# Patient Record
Sex: FEMALE | Race: WHITE | NOT HISPANIC OR LATINO | Employment: UNEMPLOYED | URBAN - METROPOLITAN AREA
[De-identification: names, ages, dates, MRNs, and addresses within clinical notes are randomized per-mention and may not be internally consistent; named-entity substitution may affect disease eponyms.]

---

## 2017-02-09 ENCOUNTER — PRE VISIT (OUTPATIENT)
Dept: UROLOGY | Facility: CLINIC | Age: 37
End: 2017-02-09

## 2017-02-14 DIAGNOSIS — R30.0 DYSURIA: Primary | ICD-10-CM

## 2017-02-15 ENCOUNTER — OFFICE VISIT (OUTPATIENT)
Dept: UROLOGY | Facility: CLINIC | Age: 37
End: 2017-02-15
Payer: COMMERCIAL

## 2017-02-15 VITALS
HEIGHT: 65 IN | DIASTOLIC BLOOD PRESSURE: 62 MMHG | BODY MASS INDEX: 22.16 KG/M2 | WEIGHT: 133 LBS | SYSTOLIC BLOOD PRESSURE: 90 MMHG | HEART RATE: 70 BPM

## 2017-02-15 DIAGNOSIS — R30.0 DYSURIA: ICD-10-CM

## 2017-02-15 DIAGNOSIS — M79.18 MYALGIA OF PELVIC FLOOR: ICD-10-CM

## 2017-02-15 DIAGNOSIS — N39.3 FEMALE STRESS INCONTINENCE: ICD-10-CM

## 2017-02-15 DIAGNOSIS — R39.15 URINARY URGENCY: ICD-10-CM

## 2017-02-15 DIAGNOSIS — M62.89 PELVIC FLOOR DYSFUNCTION: Primary | ICD-10-CM

## 2017-02-15 LAB
ALBUMIN UR-MCNC: NEGATIVE MG/DL
APPEARANCE UR: CLEAR
BETA HCG QUAL IFA URINE: NEGATIVE
BILIRUB UR QL STRIP: NEGATIVE
COLOR UR AUTO: YELLOW
GLUCOSE UR STRIP-MCNC: NEGATIVE MG/DL
HGB UR QL STRIP: NEGATIVE
KETONES UR STRIP-MCNC: NEGATIVE MG/DL
LEUKOCYTE ESTERASE UR QL STRIP: NEGATIVE
NITRATE UR QL: NEGATIVE
PH UR STRIP: 7 PH (ref 5–7)
RESIDUAL VOLUME (RV) (EXTERNAL): 60
SP GR UR STRIP: 1.02 (ref 1–1.03)
URN SPEC COLLECT METH UR: NORMAL
UROBILINOGEN UR STRIP-ACNC: 0.2 EU/DL (ref 0.2–1)

## 2017-02-15 PROCEDURE — 51798 US URINE CAPACITY MEASURE: CPT | Performed by: UROLOGY

## 2017-02-15 PROCEDURE — 87109 MYCOPLASMA: CPT | Mod: 59 | Performed by: UROLOGY

## 2017-02-15 PROCEDURE — 99214 OFFICE O/P EST MOD 30 MIN: CPT | Mod: 25 | Performed by: UROLOGY

## 2017-02-15 PROCEDURE — 84703 CHORIONIC GONADOTROPIN ASSAY: CPT | Performed by: UROLOGY

## 2017-02-15 PROCEDURE — 87109 MYCOPLASMA: CPT | Performed by: UROLOGY

## 2017-02-15 PROCEDURE — 81003 URINALYSIS AUTO W/O SCOPE: CPT | Performed by: UROLOGY

## 2017-02-15 ASSESSMENT — ENCOUNTER SYMPTOMS
HEMATURIA: 0
HALLUCINATIONS: 0
NAIL CHANGES: 0
FLANK PAIN: 0
ARTHRALGIAS: 0
PANIC: 0
EXERCISE INTOLERANCE: 0
MUSCLE CRAMPS: 0
SPUTUM PRODUCTION: 0
TACHYCARDIA: 0
HEMOPTYSIS: 0
JAUNDICE: 0
SMELL DISTURBANCE: 0
HEADACHES: 0
RESPIRATORY PAIN: 0
DISTURBANCES IN COORDINATION: 0
DYSPNEA ON EXERTION: 0
SKIN CHANGES: 0
EXTREMITY NUMBNESS: 0
BREAST MASS: 0
SHORTNESS OF BREATH: 0
HYPERTENSION: 0
WHEEZING: 0
EYE REDNESS: 0
HEARTBURN: 0
NUMBNESS: 0
SORE THROAT: 0
COUGH DISTURBING SLEEP: 0
HOARSE VOICE: 0
CLAUDICATION: 0
MUSCLE WEAKNESS: 0
DECREASED LIBIDO: 0
BLOATING: 0
LOSS OF CONSCIOUSNESS: 0
JOINT SWELLING: 0
DIZZINESS: 0
VOMITING: 0
SPEECH CHANGE: 0
FEVER: 0
DIFFICULTY URINATING: 1
BLOOD IN STOOL: 0
EYE IRRITATION: 0
POSTURAL DYSPNEA: 0
DOUBLE VISION: 0
DYSURIA: 0
NERVOUS/ANXIOUS: 0
SINUS CONGESTION: 1
LEG PAIN: 0
CHILLS: 0
ALTERED TEMPERATURE REGULATION: 0
POOR WOUND HEALING: 0
WEIGHT LOSS: 0
TREMORS: 0
BOWEL INCONTINENCE: 0
SLEEP DISTURBANCES DUE TO BREATHING: 0
ORTHOPNEA: 0
MYALGIAS: 0
RECTAL BLEEDING: 0
TINGLING: 0
POLYDIPSIA: 0
BREAST PAIN: 0
NIGHT SWEATS: 0
MEMORY LOSS: 0
STIFFNESS: 0
INSOMNIA: 0
PALPITATIONS: 0
LIGHT-HEADEDNESS: 0
DIARRHEA: 0
HYPOTENSION: 0
WEIGHT GAIN: 0
ABDOMINAL PAIN: 0
POLYPHAGIA: 0
PARALYSIS: 0
SYNCOPE: 0
EYE PAIN: 0
SNORES LOUDLY: 0
RECTAL PAIN: 0
NAUSEA: 0
WEAKNESS: 0
CONSTIPATION: 0
DECREASED CONCENTRATION: 0
SINUS PAIN: 0
NECK PAIN: 0
HOT FLASHES: 0
EYE WATERING: 0
BRUISES/BLEEDS EASILY: 0
SWOLLEN GLANDS: 0
DECREASED APPETITE: 0
DEPRESSION: 0
TROUBLE SWALLOWING: 0
COUGH: 0
LEG SWELLING: 0
BACK PAIN: 0
NECK MASS: 0
INCREASED ENERGY: 0
SEIZURES: 0
FATIGUE: 0
TASTE DISTURBANCE: 0

## 2017-02-15 ASSESSMENT — PAIN SCALES - GENERAL: PAINLEVEL: MILD PAIN (2)

## 2017-02-15 NOTE — NURSING NOTE
Chief Complaint   Patient presents with     Other     Patient is still having issues with pelvic floor and bladder since having a baby last year.     John Barone LPN 10:58 AM February 15, 2017

## 2017-02-15 NOTE — PATIENT INSTRUCTIONS
Please return for us to look inside your bladder (cystoscopy)    Please return to [facility] for pelvic floor therapy    We will let you know the results of your urine testing

## 2017-02-15 NOTE — MR AVS SNAPSHOT
After Visit Summary   2/15/2017    Cherie Emanuel    MRN: [id number]           Patient Information     Date Of Birth          1980        Visit Information        Provider Department      2/15/2017 10:40 AM Palma Patterson MD [facility] [place]        Today's Diagnoses     Pelvic floor dysfunction    -  1    Dysuria        Urinary urgency        Female stress incontinence        Myalgia of pelvic floor          Care Instructions    Please return for us to look inside your bladder (cystoscopy)    Please return to [facility] for pelvic floor therapy    We will let you know the results of your urine testing        Follow-ups after your visit        Additional Services     [facility] Physical Therapy Referral       **This order will print in the [facility] Scheduling Office**    Physical Therapy, Hand Therapy and Chiropractic Care are available through:    *[facility]  *[facility]  *[facility]    Call one number to schedule at any of the above locations: [phone].    Your provider has referred you to: Physical Therapy at [facility] or [facility]    Indication/Reason for Referral: Women's Health (Please Complete Special Programs SmartList)  Onset of Illness: years  Therapy Orders: Evaluate and Treat  Special Programs: None and Women's Health: Pelvic Dysfunction: myofascial tenderness of the pelvic floor, pelvic floor dysfunction  Pelvic Floor Weakness: stress incontinence and  Biofeedback, E-Stim/TENS/TENS Rental if deemed appropriate by therapist, Exercise/HEP and Myofascial Release/Massage (internal)  Special Request: Exercise: Home Exercise Program  Manual Therapy: Myofascial Release/Massage (internal)  Modalities: As Indicated E-Stim/TENS    Alphonso Werner      Additional Comments for the Therapist or Chiropractor: No john please.  Core strengthening    Please be aware that coverage of these services is subject to the terms  and limitations of your health insurance plan.  Call member services at your health plan with any benefit or coverage questions.      Please bring the following to your appointment:    *Your personal calendar for scheduling future appointments  *Comfortable clothing                  Your next 10 appointments already scheduled     Mar 08, 2017 10:00 AM CST   Cystoscopy with Palma Patterson MD, UA CYF   [facility] ([facility])    [address]  [address]  [facility] [address] [phone]              Who to contact     If you have questions or need follow up information about today's clinic visit or your schedule please contact [facility] [place] directly at [phone].  Normal or non-critical lab and imaging results will be communicated to you by [employer], letter or phone within 4 business days after the clinic has received the results. If you do not hear from us within 7 days, please contact the clinic through [employer] or phone. If you have a critical or abnormal lab result, we will notify you by phone as soon as possible.  Submit refill requests through [employer] or call your pharmacy and they will forward the refill request to us. Please allow 3 business days for your refill to be completed.          Additional Information About Your Visit        [employer] Information     [employer] gives you secure access to your electronic health record. If you see a primary care provider, you can also send messages to your care team and make appointments. If you have questions, please call your primary care clinic.  If you do not have a primary care provider, please call [phone] and they will assist you.        Care EveryWhere ID     This is your Care EveryWhere ID. This could be used by other organizations to access your [place] medical records  [id number]        Your Vitals Were     Pulse Height Last Period Breastfeeding? BMI  "(Body Mass Index)       70 1.651 m (5' 5\") 02/01/2017 (Approximate) No 22.13 kg/m2        Blood Pressure from Last 3 Encounters:   02/15/17 90/62   01/26/12 110/82   02/17/11 102/70    Weight from Last 3 Encounters:   02/15/17 60.3 kg (133 lb)   01/26/12 54.6 kg (120 lb 6.4 oz)   02/17/11 57.3 kg (126 lb 6.4 oz)              We Performed the Following     Beta HCG qual IFA urine     [facility] Physical Therapy Referral     MEASURE POST-VOID RESIDUAL URINE/BLADDER CAPACITY, US NON-IMAGING (84037)     Mycoplasma large colony culture     UA without Microscopic [[id number]]     Ureaplasma culture          Today's Medication Changes          These changes are accurate as of: 2/15/17 11:58 AM.  If you have any questions, ask your nurse or doctor.               Stop taking these medicines if you haven't already. Please contact your care team if you have questions.     fluticasone 50 MCG/ACT spray   Commonly known as:  FLONASE   Stopped by:  Palma Patterson MD                    Primary Care Provider Office Phone # Fax #    Kasey Kimmie Patterson [phone]       [address] [address]        Thank you!     Thank you for choosing [facility] [place]  for your care. Our goal is always to provide you with excellent care. Hearing back from our patients is one way we can continue to improve our services. Please take a few minutes to complete the written survey that you may receive in the mail after your visit with us. Thank you!             Your Updated Medication List - Protect others around you: Learn how to safely use, store and throw away your medicines at www.disposemymeds.org.          This list is accurate as of: 2/15/17 11:58 AM.  Always use your most recent med list.                   Brand Name Dispense Instructions for use    NO ACTIVE MEDICATIONS            "

## 2017-02-15 NOTE — LETTER
2/15/2017       RE: Cherie Emanuel  [address]  [place] [address]     Dear Colleague,    Thank you for referring your patient, Cherie Emanuel, to the [facility] [place] at [facility]. Please see a copy of my visit note below.    February 15, 2017    Referring Provider: Kasey Patterson MD  [facility]  [address] [place] [address] [address]  [place] [address]    Primary Care Provider: Kasey Patterson    CC: Follow up for pelvic floor dysfunction    HPI:  Cherie Emanuel is a 36 year old female who presents for follow up for evaluation of her pelvic floor symptoms.  She initially saw me at [facility] and went to pelvic floor therapy.  She has been working with [employer] and is sent back for further evaluation.      Pain is better but still has a constant awareness of her bladder around the time of her menses.  She still has a little stress incontinence.  Also notes some pain when wearing a tampon.  Is trying to do exercises on her own at home.    Does not think she could be pregnant currently but still desires to have more kids.      Asks about sugar that she was spilling in her urine during pregnancy    Past Medical History   Diagnosis Date     Depression 2010     took fluoxetine for 1 year. Stopped due to conception concerns. stable off the      Past Surgical History   Procedure Laterality Date     Mouth surgery       wisdom teeth     Social History     Social History     Marital status:      Spouse name: N/A     Number of children: N/A     Years of education: N/A     Occupational History     Not on file.     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 breast cancer     C.A.D. Paternal Grandfather       of MI in 70's     Review of Systems     Constitutional:  Negative for fever, chills, weight loss, weight gain, fatigue, decreased appetite, night sweats, recent stressors, height gain, height loss, post-operative complications, incisional pain, hallucinations, increased energy, hyperactivity and confused.   HENT:  Positive for sinus congestion. Negative for ear pain, hearing loss, tinnitus, nosebleeds, trouble swallowing, hoarse voice, mouth sores, sore throat, ear discharge, tooth pain, gum tenderness, taste disturbance, smell disturbance, hearing aid, bleeding gums, dry mouth, sinus pain and neck mass.    Eyes:  Negative for double vision, pain, redness, eye pain, decreased vision, eye watering, eye bulging, eye dryness, flashing lights, spots, floaters, strabismus, tunnel vision, jaundice and eye irritation.   Respiratory:   Negative for cough, hemoptysis, sputum production, shortness of breath, wheezing, sleep disturbances due to breathing, snores loudly, respiratory pain, dyspnea on exertion, cough disturbing sleep and postural dyspnea.    Cardiovascular:  Negative for chest pain, dyspnea on exertion, palpitations, orthopnea, claudication, leg swelling, fingers/toes turn blue, hypertension, hypotension, syncope, history of heart murmur, chest pain on exertion, chest pain at rest, pacemaker, few scattered varicosities, leg pain, sleep disturbances due to breathing, tachycardia, light-headedness, exercise intolerance and edema.   Gastrointestinal:  Negative for heartburn, nausea, vomiting, abdominal pain, diarrhea, constipation, blood in stool, melena, rectal pain, bloating, hemorrhoids, bowel incontinence, jaundice, rectal bleeding, coffee ground  "emesis and change in stool.   Genitourinary:  Positive for difficulty urinating and nocturia. Negative for bladder incontinence, dysuria, urgency, hematuria, flank pain, vaginal discharge, genital sores, dyspareunia, decreased libido, voiding less frequently, arousal difficulty, abnormal vaginal bleeding, excessive menstruation, menstrual changes, hot flashes, vaginal dryness and postmenopausal bleeding.   Musculoskeletal:  Negative for myalgias, back pain, joint swelling, arthralgias, stiffness, muscle cramps, neck pain, bone pain, muscle weakness and fracture.   Skin:  Negative for nail changes, itching, poor wound healing, rash, hair changes, skin changes, acne, warts, poor wound healing, scarring, flaky skin, Raynaud's phenomenon, sensitivity to sunlight and skin thickening.   Neurological:  Negative for dizziness, tingling, tremors, speech change, seizures, loss of consciousness, weakness, light-headedness, numbness, headaches, disturbances in coordination, extremity numbness, memory loss, difficulty walking and paralysis.   Endo/Heme:  Negative for anemia, swollen glands and bruises/bleeds easily.   Psychiatric/Behavioral:  Negative for depression, hallucinations, memory loss, decreased concentration, mood swings and panic attacks.    Breast:  Negative for breast discharge, breast mass, breast pain and nipple retraction.   Endocrine:  Negative for altered temperature regulation, polyphagia, polydipsia, unwanted hair growth and change in facial hair.    No Known Allergies    Current Outpatient Prescriptions   Medication     NO ACTIVE MEDICATIONS     No current facility-administered medications for this visit.      BP 90/62  Pulse 70  Ht 1.651 m (5' 5\")  Wt 60.3 kg (133 lb)  LMP 02/01/2017 (Approximate)  Breastfeeding? No  BMI 22.13 kg/m2 Patient's last menstrual period was 02/01/2017 (approximate). Body mass index is 22.13 kg/(m^2).  She is alert, comfortable in no acute distress. Abdomen is soft, non-tender, " non-distended, no CVAT.  Normal external female genitalia.  Negative ESST.  Speculum and bimanual exam are remarkable for persistent myofascial tenderness, primarily in the obturators.  She has excellent support on supine strain.  0/5 kegels.  R    Urine dip negative, no glucose    PVR 0 mL by bladder scan    A/P: Cherie Emanuel is a 36 year old F with urinary urgency, small volume stress incontinence, myofascial tenderness of the pelvic floor and pelvic floor dysfunction    At this time will send a urine pregnancy, ureaplasma/mycoplasma    Given persistent urinary symptoms despite therapy will have her return for a cystoscopy to evaluate for any possible bladder pathology.    Discussed that feel that her symptoms are still musculoskeletal in nature and will plan to touch base with Kelly but recommend that patient return to pelvic floor therapy.  Referral again placed.  Will also reach out to Kelly to discuss patient's treatment plan    RTC for cystoscopy    25  minutes were spent with the patient today, > 50% in counseling and coordination of care    Palma Patterson MD MPH    Urology    CC  Patient Care Team:  Kasey Kimble MD as PCP - General (Family Practice)  KASEY KIMBLE

## 2017-02-15 NOTE — PROGRESS NOTES
February 15, 2017    Referring Provider: Kasey Patterson MD  [facility]  [address] [address]    Primary Care Provider: Kasey Patterson    CC: Follow up for pelvic floor dysfunction    HPI:  Cherie Emanuel is a 36 year old female who presents for follow up for evaluation of her pelvic floor symptoms.  She initially saw me at [facility] and went to pelvic floor therapy.  She has been working with [employer] and is sent back for further evaluation.      Pain is better but still has a constant awareness of her bladder around the time of her menses.  She still has a little stress incontinence.  Also notes some pain when wearing a tampon.  Is trying to do exercises on her own at home.    Does not think she could be pregnant currently but still desires to have more kids.      Asks about sugar that she was spilling in her urine during pregnancy    Past Medical History   Diagnosis Date     Depression      took fluoxetine for 1 year. Stopped due to conception concerns. stable off the      Past Surgical History   Procedure Laterality Date     Mouth surgery       wisdom teeth     Social History     Social History     Marital status:      Spouse name: N/A     Number of children: N/A     Years of education: N/A     Occupational History     Not on file.     Social History Main Topics     Smoking status: Never Smoker     Smokeless tobacco: Never Used     Alcohol use Yes      Comment: occ, maybe glass of wine per week     Drug use: No     Sexual activity: Yes     Other Topics Concern     Not on file     Social History Narrative    Work at [employer] doing [employer] and [employer] Work, [employer]     Family History   Problem Relation Age of Onset     DIABETES Maternal Grandfather      overweight     Heart Failure Maternal Grandfather      CANCER Paternal Grandmother      ? breast cancer     C.A.D. Paternal Grandfather       of MI in 70's     Review of Systems      Constitutional:  Negative for fever, chills, weight loss, weight gain, fatigue, decreased appetite, night sweats, recent stressors, height gain, height loss, post-operative complications, incisional pain, hallucinations, increased energy, hyperactivity and confused.   HENT:  Positive for sinus congestion. Negative for ear pain, hearing loss, tinnitus, nosebleeds, trouble swallowing, hoarse voice, mouth sores, sore throat, ear discharge, tooth pain, gum tenderness, taste disturbance, smell disturbance, hearing aid, bleeding gums, dry mouth, sinus pain and neck mass.    Eyes:  Negative for double vision, pain, redness, eye pain, decreased vision, eye watering, eye bulging, eye dryness, flashing lights, spots, floaters, strabismus, tunnel vision, jaundice and eye irritation.   Respiratory:   Negative for cough, hemoptysis, sputum production, shortness of breath, wheezing, sleep disturbances due to breathing, snores loudly, respiratory pain, dyspnea on exertion, cough disturbing sleep and postural dyspnea.    Cardiovascular:  Negative for chest pain, dyspnea on exertion, palpitations, orthopnea, claudication, leg swelling, fingers/toes turn blue, hypertension, hypotension, syncope, history of heart murmur, chest pain on exertion, chest pain at rest, pacemaker, few scattered varicosities, leg pain, sleep disturbances due to breathing, tachycardia, light-headedness, exercise intolerance and edema.   Gastrointestinal:  Negative for heartburn, nausea, vomiting, abdominal pain, diarrhea, constipation, blood in stool, melena, rectal pain, bloating, hemorrhoids, bowel incontinence, jaundice, rectal bleeding, coffee ground emesis and change in stool.   Genitourinary:  Positive for difficulty urinating and nocturia. Negative for bladder incontinence, dysuria, urgency, hematuria, flank pain, vaginal discharge, genital sores, dyspareunia, decreased libido, voiding less frequently, arousal difficulty, abnormal vaginal bleeding,  "excessive menstruation, menstrual changes, hot flashes, vaginal dryness and postmenopausal bleeding.   Musculoskeletal:  Negative for myalgias, back pain, joint swelling, arthralgias, stiffness, muscle cramps, neck pain, bone pain, muscle weakness and fracture.   Skin:  Negative for nail changes, itching, poor wound healing, rash, hair changes, skin changes, acne, warts, poor wound healing, scarring, flaky skin, Raynaud's phenomenon, sensitivity to sunlight and skin thickening.   Neurological:  Negative for dizziness, tingling, tremors, speech change, seizures, loss of consciousness, weakness, light-headedness, numbness, headaches, disturbances in coordination, extremity numbness, memory loss, difficulty walking and paralysis.   Endo/Heme:  Negative for anemia, swollen glands and bruises/bleeds easily.   Psychiatric/Behavioral:  Negative for depression, hallucinations, memory loss, decreased concentration, mood swings and panic attacks.    Breast:  Negative for breast discharge, breast mass, breast pain and nipple retraction.   Endocrine:  Negative for altered temperature regulation, polyphagia, polydipsia, unwanted hair growth and change in facial hair.    No Known Allergies    Current Outpatient Prescriptions   Medication     NO ACTIVE MEDICATIONS     No current facility-administered medications for this visit.      BP 90/62  Pulse 70  Ht 1.651 m (5' 5\")  Wt 60.3 kg (133 lb)  LMP 02/01/2017 (Approximate)  Breastfeeding? No  BMI 22.13 kg/m2 Patient's last menstrual period was 02/01/2017 (approximate). Body mass index is 22.13 kg/(m^2).  She is alert, comfortable in no acute distress. Abdomen is soft, non-tender, non-distended, no CVAT.  Normal external female genitalia.  Negative ESST.  Speculum and bimanual exam are remarkable for persistent myofascial tenderness, primarily in the obturators.  She has excellent support on supine strain.  0/5 kegels.  R    Urine dip negative, no glucose    PVR 0 mL by bladder " scan    A/P: Cherie Emanuel is a 36 year old F with urinary urgency, small volume stress incontinence, myofascial tenderness of the pelvic floor and pelvic floor dysfunction    At this time will send a urine pregnancy, ureaplasma/mycoplasma    Given persistent urinary symptoms despite therapy will have her return for a cystoscopy to evaluate for any possible bladder pathology.    Discussed that feel that her symptoms are still musculoskeletal in nature and will plan to touch base with Kelly but recommend that patient return to pelvic floor therapy.  Referral again placed.  Will also reach out to [facility] to discuss patient's treatment plan    RTC for cystoscopy    25  minutes were spent with the patient today, > 50% in counseling and coordination of care    Palma Patterson MD MPH    Urology    CC  Patient Care Team:  Kasey Kimble MD as PCP - General (Family Practice)  KASEY KIMBLE

## 2017-02-16 LAB
BACTERIA SPEC CULT: NORMAL
BACTERIA SPEC CULT: NORMAL
MICRO REPORT STATUS: NORMAL
MICRO REPORT STATUS: NORMAL
SPECIMEN SOURCE: NORMAL
SPECIMEN SOURCE: NORMAL

## 2017-10-29 ENCOUNTER — HEALTH MAINTENANCE LETTER (OUTPATIENT)
Age: 37
End: 2017-10-29

## 2020-03-01 ENCOUNTER — HEALTH MAINTENANCE LETTER (OUTPATIENT)
Age: 40
End: 2020-03-01

## 2020-10-02 ENCOUNTER — TRANSFERRED RECORDS (OUTPATIENT)
Dept: MULTI SPECIALTY CLINIC | Facility: CLINIC | Age: 40
End: 2020-10-02
Payer: COMMERCIAL

## 2020-10-20 LAB
HPV ABSTRACT: NORMAL
PAP-ABSTRACT: NORMAL

## 2020-12-14 ENCOUNTER — HEALTH MAINTENANCE LETTER (OUTPATIENT)
Age: 40
End: 2020-12-14

## 2021-04-17 ENCOUNTER — HEALTH MAINTENANCE LETTER (OUTPATIENT)
Age: 41
End: 2021-04-17

## 2021-08-31 ENCOUNTER — OFFICE VISIT (OUTPATIENT)
Dept: FAMILY MEDICINE | Facility: CLINIC | Age: 41
End: 2021-08-31
Payer: COMMERCIAL

## 2021-08-31 VITALS
WEIGHT: 138.5 LBS | OXYGEN SATURATION: 99 % | DIASTOLIC BLOOD PRESSURE: 66 MMHG | TEMPERATURE: 97.7 F | HEART RATE: 94 BPM | SYSTOLIC BLOOD PRESSURE: 98 MMHG | HEIGHT: 67 IN | BODY MASS INDEX: 21.74 KG/M2

## 2021-08-31 DIAGNOSIS — G47.00 INSOMNIA, UNSPECIFIED TYPE: ICD-10-CM

## 2021-08-31 DIAGNOSIS — R68.89 EXERCISE INTOLERANCE: Primary | ICD-10-CM

## 2021-08-31 DIAGNOSIS — F41.8 DEPRESSION WITH ANXIETY: ICD-10-CM

## 2021-08-31 PROCEDURE — 99205 OFFICE O/P NEW HI 60 MIN: CPT | Performed by: FAMILY MEDICINE

## 2021-08-31 RX ORDER — SERTRALINE HYDROCHLORIDE 25 MG/1
25 TABLET, FILM COATED ORAL DAILY
Qty: 30 TABLET | Refills: 1 | Status: SHIPPED | OUTPATIENT
Start: 2021-08-31 | End: 2021-09-16

## 2021-08-31 RX ORDER — METHOCARBAMOL 500 MG/1
500 TABLET, FILM COATED ORAL DAILY
COMMUNITY
Start: 2021-05-27 | End: 2021-08-31

## 2021-08-31 ASSESSMENT — MIFFLIN-ST. JEOR: SCORE: 1334.73

## 2021-08-31 NOTE — PROGRESS NOTES
Patient presents with:  Follow Up: muscle pain and discomfort -generalized all over- onset 10 months - things are getting worse pressure and pain in glutes and lower body  Medication Reconciliation: tyloenol and Ibuprofen PRN before bed         Assessment & Plan   Problem List Items Addressed This Visit     None      Visit Diagnoses     Exercise intolerance    -  Primary    Insomnia, unspecified type        Depression with anxiety        Relevant Medications    sertraline (ZOLOFT) 25 MG tablet           Review of external notes as documented elsewhere in note  61 minutes spent on the date of the encounter doing chart review, history and exam, documentation and further activities per the note       MEDICATIONS:   Orders Placed This Encounter   Medications     DISCONTD: methocarbamol (ROBAXIN) 500 MG tablet     Sig: Take 500 mg by mouth daily     sertraline (ZOLOFT) 25 MG tablet     Sig: Take 1 tablet (25 mg) by mouth daily     Dispense:  30 tablet     Refill:  1          - Continue other medications without change  Regular exercise  Patient Instructions   I cannot put a label on your problem.  It seems related to mood and anxiety.  We will try sertraline at low dose.    Restart exercise in week with 5-10 minutes and add some daily.          Return in about 1 month (around 9/30/2021) for jumpy legs.    David Mohamud MD  [facility]    Es Crespo is a 41 year old who presents for the following health issues     HPI       SUBJECTIVE:  A 41-year-old woman in to discuss ongoing problem of exercise intolerance.  She started about 8-10 years ago with a feeling in her legs of heaviness and I would say queasiness in the muscles when she finished her workouts.  She was running regularly 3 or more times a week for 45 minutes or so.  This uncomfortable feeling actually drove her to stop running and moved to the elliptical.  With the elliptical exercise she had similar symptoms, especially  if she did not use the same machine in her fitness club.  She did gain a lot of weight with her second pregnancy and went on Medifast.  The Medifast brought the weight down, but when she quit using Medifast she then developed insomnia and was having difficulty sleeping.  The exercise issues have seemed to have a melded with the insomnia and she has ongoing difficulty with sleeping.  When she stopped exercising completely her sleep patterns were better and she has felt better.  She has been prescribed several different medications for sleep, none of which she is taking regularly.  She currently has been taking nothing more than ibuprofen or Tylenol for discomfort.  About a decade ago she was on fluoxetine for depression and anxiety.  It seemed to help, but she went off it because of her pregnancies.  She is not sure that she is done having children, so she has not tried any of the antidepressant medications although they have been offered in the past and she has considered them.  She does relate having anxiety type symptoms in her past and probably some depression type symptoms also.  She has no history of thinking of hurting herself.  She has had some GI problems that seem to wax and wane also.  She has had extensive workup, which we reviewed together and in her chart.  All her blood levels including copper levels have been normal except for one ferritin level that was elevated to 198.  Her sed rate was 2., She has had MRIs and CTs.  Apparently there is a bulging disk in the cervical spine and a bulging disk in the lower thoracic spine, but none of her symptoms fit a disk syndrome.    She is not working outside of the home.  She has 3 children, which keep her quite busy.  Her oldest child has just started playing soccer.  Her  lost his job in April, but all of her symptoms preceded that and she does not relate any difficulties due to that job loss.  He apparently has some other job opportunities for types of  work that he is doing.  She has not been a smoker.  She has a glass of wine some nights of the week.  She uses no other drugs.  She has not had any bleeding issues.  Her periods are regular.  She has not had any recent illness.  She did lose her taste and smell for a couple of days last fall.  Her COVID testing was negative.  She had no other symptoms.  She is worried about the potential for chronic Lyme's disease.  She has been treated in the past for premenstrual syndrome or PMS. She has no history of Raynauds.    She appears healthy.  Her skin is clear.  There are no fasciculations noted in her leg or arm muscles.  She is coherent and makes clear sense.  Her history is well described.    ASSESSMENT:  I am not sure that I can put a label on this.  It seems like she may have some depression with anxiety and is seeing this manifested as muscle sensations and poor sleep.  We talked about starting an SSRI I think one from the antianxiety type class would be helpful.  We settled on sertraline as it does have sometimes some improved sleep.  We will give her 25 mg daily in the mornings for the next month.  If she feels sleepy during the day she can shift the dose to the evenings.  We will recheck her in 4-6 weeks and decide it we want to increase the dose or switch medications.    We talked briefly about gluten intolerance and other food intolerances that might contribute to this symptom complex or a sub clinical chronic inflammatory response.    The patient involved in medical decision making throughout the visit.    We will recheck in 4-6 weeks.  She should call if other issues arise.          Review of Systems   Constitutional, HEENT, cardiovascular, pulmonary, gi and gu systems are negative, except as otherwise noted.    Family History     Problem (# of Occurrences) Relation (Name,Age of Onset)    C.A.D. (1) Paternal Grandfather:  of MI in 70's    Cancer (1) Paternal Grandmother: ? breast cancer    Diabetes (1)  "Maternal Grandfather: overweight    Heart Failure (1) Maternal Grandfather        Past Medical History:   Diagnosis Date     Depression 2010    took fluoxetine for 1 year. Stopped due to conception concerns. stable off the      Past Surgical History:   Procedure Laterality Date     MOUTH SURGERY      wisdom teeth             Objective    BP 98/66   Pulse 94   Temp 97.7  F (36.5  C) (Oral)   Ht 1.7 m (5' 6.93\")   Wt 62.8 kg (138 lb 8 oz)   SpO2 99%   BMI 21.74 kg/m    Body mass index is 21.74 kg/m .  Physical Exam           "

## 2021-09-14 DIAGNOSIS — F41.8 DEPRESSION WITH ANXIETY: ICD-10-CM

## 2021-09-16 DIAGNOSIS — F41.8 DEPRESSION WITH ANXIETY: ICD-10-CM

## 2021-09-16 RX ORDER — SERTRALINE HYDROCHLORIDE 25 MG/1
TABLET, FILM COATED ORAL
Qty: 90 TABLET | Refills: 1 | Status: SHIPPED | OUTPATIENT
Start: 2021-09-16 | End: 2021-09-16

## 2021-09-16 RX ORDER — SERTRALINE HYDROCHLORIDE 25 MG/1
25 TABLET, FILM COATED ORAL DAILY
Qty: 90 TABLET | Refills: 4 | Status: SHIPPED | OUTPATIENT
Start: 2021-09-16 | End: 2022-04-26

## 2021-10-02 ENCOUNTER — HEALTH MAINTENANCE LETTER (OUTPATIENT)
Age: 41
End: 2021-10-02

## 2022-04-26 ENCOUNTER — OFFICE VISIT (OUTPATIENT)
Dept: FAMILY MEDICINE | Facility: CLINIC | Age: 42
End: 2022-04-26
Payer: COMMERCIAL

## 2022-04-26 VITALS
RESPIRATION RATE: 16 BRPM | BODY MASS INDEX: 24.32 KG/M2 | WEIGHT: 146 LBS | HEART RATE: 72 BPM | SYSTOLIC BLOOD PRESSURE: 120 MMHG | OXYGEN SATURATION: 99 % | DIASTOLIC BLOOD PRESSURE: 62 MMHG | HEIGHT: 65 IN | TEMPERATURE: 98.2 F

## 2022-04-26 DIAGNOSIS — U09.9 POST-COVID CHRONIC MUSCLE PAIN: ICD-10-CM

## 2022-04-26 DIAGNOSIS — M79.10 POST-COVID CHRONIC MUSCLE PAIN: ICD-10-CM

## 2022-04-26 DIAGNOSIS — Z11.59 NEED FOR HEPATITIS C SCREENING TEST: ICD-10-CM

## 2022-04-26 DIAGNOSIS — G89.29 POST-COVID CHRONIC MUSCLE PAIN: ICD-10-CM

## 2022-04-26 DIAGNOSIS — M79.10 MYALGIA: ICD-10-CM

## 2022-04-26 LAB
CRP SERPL-MCNC: <2.9 MG/L (ref 0–8)
ERYTHROCYTE [SEDIMENTATION RATE] IN BLOOD BY WESTERGREN METHOD: 4 MM/HR (ref 0–20)
HCV AB SERPL QL IA: NONREACTIVE

## 2022-04-26 PROCEDURE — 85652 RBC SED RATE AUTOMATED: CPT | Performed by: FAMILY MEDICINE

## 2022-04-26 PROCEDURE — 86140 C-REACTIVE PROTEIN: CPT | Performed by: FAMILY MEDICINE

## 2022-04-26 PROCEDURE — 86769 SARS-COV-2 COVID-19 ANTIBODY: CPT | Mod: 90 | Performed by: FAMILY MEDICINE

## 2022-04-26 PROCEDURE — 36415 COLL VENOUS BLD VENIPUNCTURE: CPT | Performed by: FAMILY MEDICINE

## 2022-04-26 PROCEDURE — 86038 ANTINUCLEAR ANTIBODIES: CPT | Performed by: FAMILY MEDICINE

## 2022-04-26 PROCEDURE — 99214 OFFICE O/P EST MOD 30 MIN: CPT | Performed by: FAMILY MEDICINE

## 2022-04-26 PROCEDURE — 86200 CCP ANTIBODY: CPT | Performed by: FAMILY MEDICINE

## 2022-04-26 PROCEDURE — 99000 SPECIMEN HANDLING OFFICE-LAB: CPT | Performed by: FAMILY MEDICINE

## 2022-04-26 PROCEDURE — 86431 RHEUMATOID FACTOR QUANT: CPT | Performed by: FAMILY MEDICINE

## 2022-04-26 PROCEDURE — 86803 HEPATITIS C AB TEST: CPT | Performed by: FAMILY MEDICINE

## 2022-04-26 RX ORDER — FLUVOXAMINE MALEATE 50 MG
50 TABLET ORAL
COMMUNITY
Start: 2022-04-21 | End: 2023-04-21

## 2022-04-26 RX ORDER — CHOLECALCIFEROL (VITAMIN D3) 25 MCG
1 TABLET ORAL DAILY
COMMUNITY
Start: 2021-11-18 | End: 2022-09-13

## 2022-04-26 RX ORDER — IBUPROFEN 200 MG
TABLET ORAL
COMMUNITY
Start: 2020-11-15 | End: 2022-09-13

## 2022-04-26 RX ORDER — ACETAMINOPHEN 500 MG
TABLET ORAL
COMMUNITY
Start: 2020-11-15 | End: 2022-09-13

## 2022-04-26 RX ORDER — GABAPENTIN 100 MG/1
CAPSULE ORAL
COMMUNITY
Start: 2022-02-16 | End: 2022-12-27

## 2022-04-26 NOTE — PROGRESS NOTES
Assessment & Plan   See after visit summary and result note from studies for helpful information and advice given to patient.    Myalgia  Possibly related to previous COVID-19 infection.  No obvious rheumatoid problem per lab results.  - Anti Nuclear Clarisa IgG by IFA with Reflex  - CRP inflammation  - Cyclic Citrullinated Peptide Antibody IgG  - Erythrocyte sedimentation rate auto  - Rheumatoid factor  - Adult Post Covid Clinic Referral  - SARS-CoV-2 Nucleocapsid Total Ab    Need for hepatitis C screening test  Negative screening.  - Hepatitis C Screen Reflex to HCV RNA Quant and Genotype    Post-COVID chronic muscle pain  Positive lab test showing evidence of previous COVID-19 infection.                   Return in about 3 months (around 7/26/2022) for with any available provider.    [facility] is a 41 year old who presents for the following health issues     Pain         Pain History:  When did you first notice your pain? - Chronic Pain   Have you seen this provider for your pain in the past? No   Where in your body do your have pain? Whole body  Are you seeing anyone else for your pain? Yes - neuro  What makes your pain better? Sleep, slight improvement with ibuprofen  What makes your pain worse? Pressure points  How has pain affected your ability to work? Not currently working - unrelated to pain  Who lives in your household?  and three kids                Review of Systems   Patient is seen to discuss management of chronic muscle pain, and referral to a post-COVID clinic.    Had positive COVID-19 test 3 months ago, but her chronic generalized muscle pain proceeded this.    Has not yet started taking the gabapentin, fluvoxamine, or Vatamin D3 recommended to her on review of her medication list.     The fluvoxamine was prescribed to help with PMS symptoms and pain relief.     She is concerned about the possible side effects of  "gabapentin.    Patient started to have whole body discomfort in Nov 2020, and it seems to have gotten slightly worse since her diagnosed COVID-19 infection about 3 months ago.  She feels she may have had COVID-19 in 2020 also.  This was never lab diagnosed.  She would like to get antibody testing done to assess for previous COVID-19 illness.    Pain tends to occur wherever there is pressure on body within a couple minutes. At exam, she has buttock pain sitting in chair.     Patient will note a finger swelling a \"few times/week\" for several hours.  Considering this, we will assess for possible rheumatoid arthritis condition.      Objective    /62 (BP Location: Right arm, Patient Position: Chair, Cuff Size: Adult Regular)   Pulse 72   Temp 98.2  F (36.8  C) (Oral)   Resp 16   Ht 1.657 m (5' 5.25\")   Wt 66.2 kg (146 lb)   LMP 04/04/2022   SpO2 99%   Breastfeeding No   BMI 24.11 kg/m    Body mass index is 24.11 kg/m .  Physical Exam   Vital signs reviewed.  Patient is in no acute appearing distress.  Breathing appears nonlabored.  Patient is alert and oriented ×3.  Patient is very pleasant, making good eye contact and responding with clear fluent speech.  Patient can get up and down from exam room chair and table without visible difficulty.    ENT: Ear exam shows bilateral tympanic membranes to be clear without injection, nasal turbinates show no injection or edema, no pharyngeal injection or exudate.    Eyes: No scleral, lid, or periorbital injection or edema noted.  No eye mattering noted.  Corneas are clear. Pupils are equal round and reactive to light with normal consensual eye movement.    Neck: supple with no adenoapthy, palpable abnormal masses, or thyroid abnormality.    Heart: Heart rate is regular without murmur.    Lungs: Lungs are clear to auscultation with good airflow bilaterally.    Back: No areas of spinal or soft tissue tenderness.    Skin/extremities: Warm and dry, with no lower leg " edema.    Joint exam: No large or small joint edema, increased temperature, or tenderness noted at exam.  Nl exam.

## 2022-04-26 NOTE — PATIENT INSTRUCTIONS
I will review your studies via [employer] when they are available. If you have any questions or concerns please let me know via [employer], or call the clinic. I think starting your prescribed treatment with gabapentin and fluvoxamine should be tried. I recommend trying the gabapentin first.

## 2022-04-27 LAB
ANA SER QL IF: NEGATIVE
CCP AB SER IA-ACNC: 1.6 U/ML
RHEUMATOID FACT SER NEPH-ACNC: 6 IU/ML

## 2022-04-28 LAB — SARS-COV-2 AB SERPL QL IA: POSITIVE

## 2022-05-10 SDOH — SOCIAL STABILITY: SOCIAL NETWORK

## 2022-05-10 SDOH — SOCIAL STABILITY: SOCIAL NETWORK: I HAVE TROUBLE DOING ALL OF MY USUAL WORK (INCLUDE WORK AT HOME): SOMETIMES

## 2022-05-10 SDOH — SOCIAL STABILITY: SOCIAL NETWORK: I HAVE TROUBLE DOING ALL OF THE ACTIVITIES WITH FRIENDS THAT I WANT TO DO: SOMETIMES

## 2022-05-10 SDOH — SOCIAL STABILITY: SOCIAL NETWORK: I HAVE TROUBLE DOING ALL OF THE FAMILY ACTIVITIES THAT I WANT TO DO: SOMETIMES

## 2022-05-10 SDOH — SOCIAL STABILITY: SOCIAL NETWORK: I HAVE TROUBLE DOING ALL OF MY REGULAR LEISURE ACTIVITIES WITH OTHERS: RARELY

## 2022-05-10 SDOH — SOCIAL STABILITY: SOCIAL NETWORK: PROMIS ABILITY TO PARTICIPATE IN SOCIAL ROLES & ACTIVITIES T-SCORE: 46.4

## 2022-05-10 ASSESSMENT — ENCOUNTER SYMPTOMS
TINGLING: 1
NERVOUS/ANXIOUS: 1
INSOMNIA: 1
DEPRESSION: 1
SINUS CONGESTION: 1
MYALGIAS: 1

## 2022-05-10 ASSESSMENT — ANXIETY QUESTIONNAIRES
5. BEING SO RESTLESS THAT IT IS HARD TO SIT STILL: NOT AT ALL
3. WORRYING TOO MUCH ABOUT DIFFERENT THINGS: SEVERAL DAYS
7. FEELING AFRAID AS IF SOMETHING AWFUL MIGHT HAPPEN: SEVERAL DAYS
1. FEELING NERVOUS, ANXIOUS, OR ON EDGE: SEVERAL DAYS
2. NOT BEING ABLE TO STOP OR CONTROL WORRYING: SEVERAL DAYS
6. BECOMING EASILY ANNOYED OR IRRITABLE: SEVERAL DAYS
GAD7 TOTAL SCORE: 5
8. IF YOU CHECKED OFF ANY PROBLEMS, HOW DIFFICULT HAVE THESE MADE IT FOR YOU TO DO YOUR WORK, TAKE CARE OF THINGS AT HOME, OR GET ALONG WITH OTHER PEOPLE?: SOMEWHAT DIFFICULT
4. TROUBLE RELAXING: NOT AT ALL
GAD7 TOTAL SCORE: 5
GAD7 TOTAL SCORE: 5
7. FEELING AFRAID AS IF SOMETHING AWFUL MIGHT HAPPEN: SEVERAL DAYS

## 2022-05-10 ASSESSMENT — PATIENT HEALTH QUESTIONNAIRE - PHQ9
SUM OF ALL RESPONSES TO PHQ QUESTIONS 1-9: 3
10. IF YOU CHECKED OFF ANY PROBLEMS, HOW DIFFICULT HAVE THESE PROBLEMS MADE IT FOR YOU TO DO YOUR WORK, TAKE CARE OF THINGS AT HOME, OR GET ALONG WITH OTHER PEOPLE: SOMEWHAT DIFFICULT
SUM OF ALL RESPONSES TO PHQ QUESTIONS 1-9: 3

## 2022-05-11 ASSESSMENT — ANXIETY QUESTIONNAIRES: GAD7 TOTAL SCORE: 5

## 2022-05-11 ASSESSMENT — PATIENT HEALTH QUESTIONNAIRE - PHQ9: SUM OF ALL RESPONSES TO PHQ QUESTIONS 1-9: 3

## 2022-05-12 ENCOUNTER — VIRTUAL VISIT (OUTPATIENT)
Dept: PHYSICAL MEDICINE AND REHAB | Facility: CLINIC | Age: 42
End: 2022-05-12
Payer: COMMERCIAL

## 2022-05-12 DIAGNOSIS — M79.10 MYALGIA: Primary | ICD-10-CM

## 2022-05-12 DIAGNOSIS — G62.9 NEUROPATHY: ICD-10-CM

## 2022-05-12 DIAGNOSIS — R29.90 POST-COVID CHRONIC NEUROLOGIC SYMPTOMS: ICD-10-CM

## 2022-05-12 DIAGNOSIS — U09.9 POST-COVID CHRONIC NEUROLOGIC SYMPTOMS: ICD-10-CM

## 2022-05-12 PROCEDURE — 99205 OFFICE O/P NEW HI 60 MIN: CPT | Mod: 95 | Performed by: PHYSICIAN ASSISTANT

## 2022-05-12 ASSESSMENT — ENCOUNTER SYMPTOMS
NERVOUS/ANXIOUS: 1
MYALGIAS: 1

## 2022-05-12 NOTE — PROGRESS NOTES
Cherie is a 41 year old who is being evaluated via a billable video visit.      How would you like to obtain your AVS? MyChart  If the video visit is dropped, the invitation should be resent by: Send to e-mail at: [email or web address]  Will anyone else be joining your video visit? Yes: . How would they like to receive their invitation? Send to e-mail at: [email or web address]        Assessment/ Impression:   1. Myalgia  Patient with muscle pain and tenderness to touch.  Extensive lab work has been done through [facility]  which has been normal.  Patient does have an appointment with Rheumatology in [facility] in a few months, encouraged to keep and discussed possible fibromyalgia. Will recheck B12, B6 and MMA.   - Vitamin B12; Future  - Methylmalonic Acid; Future  - Vitamin B6; Future    2.  Post-COVID chronic neurologic symptoms/ Neuropathy  Patient with burning pain with pressure, discussed neuropathy and encouraged to try gabapentin which has been prescribed by outside neurology.  Discussed side effects again with patient and discussed referral to [place] to evaluate for possible small fiber neuropathy.   Patient had high B6 in past and low normal B12. Will check labs.   - Adult Neurology  Referral; Future  - Vitamin B12; Future  - Methylmalonic Acid; Future  - Vitamin B6; Future        Plan:  1. I reviewed present knowledge on long-Covid.  Education was provided and question were answered.  2. I will follow up with Cherie Emanuel in 3 months. I will review progress and consider need for any other therapeutic interventions. If there are any questions and/or concerns she will call the clinic.      On day of encounter time spent in chart review and with patient in consultation, exam, education and coordination of care, excluding procedures:  80 minutes       Nidhi Herbert PA-C  [facility] [place]    Subjective   This 41 year old  female presents to the [facility] Medicine Post-COVID clinic as a new consult to evaluate continuing symptoms after COVID infection initially diagnosed February 3, 2022 . Briefly,  Cherie Emanuel presented to primary care on September 11th 2020 complaining of loss of taste and smell and having blurred vision.  Did got to a drive up and had a negative test unfortunately.  A month later she developed pain with sitting or laying down.  She states the pain was as if she was laying on a hard surface for a long time.  She did state the pain was sometimes burning and is disrupting her sleep.  Patient in January 2021 did also develop numbness and tingling in hands and feet which resoled after 6 months.  Patient got a COVID vaccine in May of 2021 and her pain intensified for 6 weeks. Patient did contract COVID in February of 2022. She at this time complained sore throat, congestion, fever, cough. She did not see care for her symptoms and treated with over the counter medications. These symptoms resolve but her myalgia returned again and was worse for 1-2 months and have improved minimally.   Continuing symptoms include myalgia, and nipple sensitivity and burning pain. Patient has tried PT for her myalgias and also [facility] a [facility] of [facility]. Past medical history is significant for sleep disturbance. The patient was vaccinated against COVID X2.  Previous activity was part time work.     History of COVID-19 infection: February 3rd, 2022  Date of first symptoms: February 3rd  Diagnosis: antigen  Hospitalization: No  Treatment:Symptomatic, OTC  Current Symptoms: See subjective  Goals of Care: Decrease pain, decrease anxiety and decrease depression      PHQ Assesment Total Score(s) 5/10/2022   PHQ-9 Score 3   Some recent data might be hidden     SANDRA-7 Results 5/10/2022   SANDRA 7 TOTAL SCORE 5 (mild anxiety)   Some recent data might be hidden     PTSD Screen Score 5/10/2022   Have  you ever experienced this kind of event? No   Some recent data might be hidden     PROMIS-29 5/10/2022   PROMIS Physical Function T-Score 41.8 (mild dysfunction)   PROMIS Anxiety T-Score 63.4 (moderate)   PROMIS Depression T-Score 57.3 (mild)   PROMIS Fatigue T-Score 57 (mild)   PROMIS Sleep Disturbance T-Score 54.3 (within normal limits)   PROMIS Ability to Participate in Social Roles & Activities T-Score 46.4 (within normal limits)   PROMIS Pain Interference T-Score 68 (moderate)   PROMIS Pain Intensity 5       Past Medical History:   Diagnosis Date     Depression     took fluoxetine for 1 year. Stopped due to conception concerns. stable off the        Past Surgical History:   Procedure Laterality Date     MOUTH SURGERY      wisdom teeth       Family History   Problem Relation Age of Onset     Diabetes Maternal Grandfather         overweight     Heart Failure Maternal Grandfather      Cancer Paternal Grandmother         ? breast cancer     C.A.D. Paternal Grandfather          of MI in 70's       Social History     Tobacco Use     Smoking status: Never Smoker     Smokeless tobacco: Never Used   Vaping Use     Vaping Use: Never used   Substance Use Topics     Alcohol use: Yes     Comment: occ, maybe glass of wine per week     Drug use: Never         Current Outpatient Medications:      acetaminophen (TYLENOL) 500 MG tablet, , Disp: , Rfl:      fluvoxaMINE (LUVOX) 50 MG tablet, Take 50 mg by mouth, Disp: , Rfl:      gabapentin (NEURONTIN) 100 MG capsule, , Disp: , Rfl:      ibuprofen (ADVIL/MOTRIN) 200 MG tablet, , Disp: , Rfl:      magnesium carbonate (MAGONATE) 200 mg/ml liquid, , Disp: , Rfl:      NO ACTIVE MEDICATIONS, , Disp: , Rfl:      VITAMIN D3 25 MCG (1000 UT) tablet, Take 1 tablet by mouth daily, Disp: , Rfl:         Answers for HPI/ROS submitted by the patient on 5/10/2022  Review of Systems   Musculoskeletal: Positive for myalgias.   Psychiatric/Behavioral: The patient is nervous/anxious.            Objective    Vitals - Patient Reported  Pain Score: Mild Pain (3)  Pain Loc: Other - see comment (mostly glute muscles, but also all over pain)        Physical Exam   GENERAL: Healthy, alert and no distress  EYES: Eyes grossly normal to inspection.  No discharge or erythema, or obvious scleral/conjunctival abnormalities.  HENT: Normal cephalic/atraumatic.  External ears, nose and mouth without ulcers or lesions.  No nasal drainage visible.  NECK: No asymmetry, visible masses or scars  RESP: No audible wheeze, cough, or visible cyanosis.  No visible retractions or increased work of breathing.    SKIN: Visible skin clear. No significant rash, abnormal pigmentation or lesions.  NEURO: Cranial nerves grossly intact.  Mentation and speech appropriate for age.  PSYCH: Mentation appears normal, affect normal/bright, judgement and insight intact, normal speech and appearance well-groomed.      SARS-CoV-2 Nucleocapsid Total Ab, S (no units)   Date Value   04/26/2022 Positive (A)          CRP Inflammation   Date Value Ref Range Status   04/26/2022 <2.9 0.0 - 8.0 mg/L Final      Erythrocyte Sedimentation Rate   Date Value Ref Range Status   04/26/2022 4 0 - 20 mm/hr Final      Last Renal Panel:  Potassium   Date Value Ref Range Status   06/09/2004 4.1 3.5 - 5.1 mmol/L      Comment:     [facility]     Glucose   Date Value Ref Range Status   02/17/2011 71 60 - 99 mg/dL Final     Creatinine   Date Value Ref Range Status   06/09/2004 0.8 0.6 - 1.3 mg/dL      Comment:     [facility]      No results found for: WBC  No results found for: RBC  Lab Results   Component Value Date    HGB 15.1 03/30/2011     Lab Results   Component Value Date    HCT 43.5 03/30/2011     No components found for: MCT  Lab Results   Component Value Date    MCV 91.5 03/30/2011     Lab Results   Component Value Date    MCH 31.8 03/30/2011     Lab Results   Component Value Date    MCHC 34.7 03/30/2011     Lab Results    Component Value Date    RDW 13.4 03/30/2011     No results found for: PLT   No results found for: A1C   TSH   Date Value Ref Range Status   01/26/2012 2.79 0.4 - 5.0 mU/L Final      No results found for: VITDT   No results for input(s): MAG in the last [id number] hours.  Last Comprehensive Metabolic Panel:  Potassium   Date Value Ref Range Status   06/09/2004 4.1 3.5 - 5.1 mmol/L      Comment:     [facility]     Glucose   Date Value Ref Range Status   02/17/2011 71 60 - 99 mg/dL Final     Creatinine   Date Value Ref Range Status   06/09/2004 0.8 0.6 - 1.3 mg/dL      Comment:     [facility]     ALT   Date Value Ref Range Status   06/09/2004 71 (A) 0 - 65 U/L      Comment:     [facility]     AST   Date Value Ref Range Status   06/09/2004 35 0 - 37 U/L      Comment:     [facility]     Most Recent D-dimer:No lab results found.    Office Visit on 04/26/2022   Component Date Value Ref Range Status     KETTY interpretation 04/26/2022 Negative  Negative Final      Negative:              <1:40  Borderline Positive:   1:40 - 1:80  Positive:              >1:80     CRP Inflammation 04/26/2022 <2.9  0.0 - 8.0 mg/L Final     Cyclic Citrullinated Peptide Antib* 04/26/2022 1.6  <7.0 U/mL Final    Negative     Erythrocyte Sedimentation Rate 04/26/2022 4  0 - 20 mm/hr Final     Rheumatoid Factor 04/26/2022 6  <12 IU/mL Final     Hepatitis C Antibody 04/26/2022 Nonreactive  Nonreactive Final     SARS-CoV-2 Nucleocapsid Total Ab, S 04/26/2022 Positive (A) Negative Final    SARS-CoV-2 antibodies detected. Results suggest recent   or prior SARS-CoV-2 infection. Correlation with   epidemiologic risk factors and other clinical and   laboratory findings is recommended. Serologic results   should not be used to diagnose recent SARS-CoV-2 infection.   Protective immunity cannot be inferred based on these   results alone. False positive results may occur due to   cross reactivity  from pre-existing antibodies or   other possible causes.     -------------------ADDITIONAL INFORMATION-------------------  Testing was performed using the Roche Elecsys   Anti-SARS-CoV-2 Reagent assay from [employer],   which has received Emergency Use Authorization(EUA)  by the U.S. Food and Drug Administration.     Fact sheets for this Emergency Use Authorization (EUA)   assay can be found at the following links:      For Healthcare Providers:  https://www.fda.gov/media/746056/download  For Patients:  https://www.fda.gov/media/040438/download     Patient's Race 04/26/2022 White   Final     Patient's Ethnicity 04/26/2022 Not    Final       Test Performed by:  [address] [address]  : David Dillon M.D. Ph.D.; CLIA# [id number]     Labs and Imaging from [employer] reviewed       Video-Visit Details  Video Start Time: 10:01 AM    Type of service:  Video Visit    Video End Time:10:56 AM    Originating Location (pt. Location): Home    Distant Location (provider location):  [facility] PHYSICAL MEDICINE AND REHABILITATION [facility]     Platform used for Video Visit: QuantaSol

## 2022-05-12 NOTE — PATIENT INSTRUCTIONS
Post COVID Self Care Suggestions:     Fatigue Management:       https://www.archives-pmr.org/action/showPdf?[email or web address]       Self Care:      [email or web address]  Recovery World Health Organization:    https://apps.who.int/iris/bitstream/handle/38994/034693/ZKS-TQJA-0916-942-05961-54811-eng.pdf  Breathing exercises:    [email or web address]-[email or web address]-exercises

## 2022-05-12 NOTE — LETTER
5/12/2022       RE: Cherie Emanuel  [id number]  [facility] [address]     Dear Colleague,    Thank you for referring your patient, Cherie Emanuel, to the [facility] PHYSICAL MEDICINE AND REHABILITATION [facility] at [facility]. Please see a copy of my visit note below.      Assessment/ Impression:   1. Myalgia  Patient with muscle pain and tenderness to touch.  Extensive lab work has been done through [facility]  which has been normal.  Patient does have an appointment with Rheumatology in [facility] in a few months, encouraged to keep and discussed possible fibromyalgia. Will recheck B12, B6 and MMA.   - Vitamin B12; Future  - Methylmalonic Acid; Future  - Vitamin B6; Future    2.  Post-COVID chronic neurologic symptoms/ Neuropathy  Patient with burning pain with pressure, discussed neuropathy and encouraged to try gabapentin which has been prescribed by outside neurology.  Discussed side effects again with patient and discussed referral to [facility] to evaluate for possible small fiber neuropathy.   Patient had high B6 in past and low normal B12. Will check labs.   - Adult Neurology  Referral; Future  - Vitamin B12; Future  - Methylmalonic Acid; Future  - Vitamin B6; Future        Plan:  1. I reviewed present knowledge on long-Covid.  Education was provided and question were answered.  2. I will follow up with Cherie Emanuel in 3 months. I will review progress and consider need for any other therapeutic interventions. If there are any questions and/or concerns she will call the clinic.      On day of encounter time spent in chart review and with patient in consultation, exam, education and coordination of care, excluding procedures:  80 minutes       Nidhi Herbert PA-C  [facility] PHYSICAL MEDICINE AND REHABILITATION CLINIC [place]    Subjective   This 41 year old female presents to the [facility]  Rehabilitation Medicine Post-COVID clinic as a new consult to evaluate continuing symptoms after COVID infection initially diagnosed February 3, 2022 . Briefly,  Cherie Emanuel presented to primary care on September 11th 2020 complaining of loss of taste and smell and having blurred vision.  Did got to a drive up and had a negative test unfortunately.  A month later she developed pain with sitting or laying down.  She states the pain was as if she was laying on a hard surface for a long time.  She did state the pain was sometimes burning and is disrupting her sleep.  Patient in January 2021 did also develop numbness and tingling in hands and feet which resoled after 6 months.  Patient got a COVID vaccine in May of 2021 and her pain intensified for 6 weeks. Patient did contract COVID in February of 2022. She at this time complained sore throat, congestion, fever, cough. She did not see care for her symptoms and treated with over the counter medications. These symptoms resolve but her myalgia returned again and was worse for 1-2 months and have improved minimally.   Continuing symptoms include myalgia, and nipple sensitivity and burning pain. Patient has tried PT for her myalgias and also [facility]. Past medical history is significant for sleep disturbance. The patient was vaccinated against COVID X2.  Previous activity was part time work.     History of COVID-19 infection: February 3rd, 2022  Date of first symptoms: February 3rd  Diagnosis: antigen  Hospitalization: No  Treatment:Symptomatic, OTC  Current Symptoms: See subjective  Goals of Care: Decrease pain, decrease anxiety and decrease depression      PHQ Assesment Total Score(s) 5/10/2022   PHQ-9 Score 3   Some recent data might be hidden     SANDRA-7 Results 5/10/2022   SANDRA 7 TOTAL SCORE 5 (mild anxiety)   Some recent data might be hidden     PTSD Screen Score 5/10/2022   Have you ever experienced this kind of event? No    Some recent data might be hidden     PROMIS-29 5/10/2022   PROMIS Physical Function T-Score 41.8 (mild dysfunction)   PROMIS Anxiety T-Score 63.4 (moderate)   PROMIS Depression T-Score 57.3 (mild)   PROMIS Fatigue T-Score 57 (mild)   PROMIS Sleep Disturbance T-Score 54.3 (within normal limits)   PROMIS Ability to Participate in Social Roles & Activities T-Score 46.4 (within normal limits)   PROMIS Pain Interference T-Score 68 (moderate)   PROMIS Pain Intensity 5       Past Medical History:   Diagnosis Date     Depression     took fluoxetine for 1 year. Stopped due to conception concerns. stable off the        Past Surgical History:   Procedure Laterality Date     MOUTH SURGERY      wisdom teeth       Family History   Problem Relation Age of Onset     Diabetes Maternal Grandfather         overweight     Heart Failure Maternal Grandfather      Cancer Paternal Grandmother         ? breast cancer     C.A.D. Paternal Grandfather          of MI in 70's       Social History     Tobacco Use     Smoking status: Never Smoker     Smokeless tobacco: Never Used   Vaping Use     Vaping Use: Never used   Substance Use Topics     Alcohol use: Yes     Comment: occ, maybe glass of wine per week     Drug use: Never         Current Outpatient Medications:      acetaminophen (TYLENOL) 500 MG tablet, , Disp: , Rfl:      fluvoxaMINE (LUVOX) 50 MG tablet, Take 50 mg by mouth, Disp: , Rfl:      gabapentin (NEURONTIN) 100 MG capsule, , Disp: , Rfl:      ibuprofen (ADVIL/MOTRIN) 200 MG tablet, , Disp: , Rfl:      magnesium carbonate (MAGONATE) 200 mg/ml liquid, , Disp: , Rfl:      NO ACTIVE MEDICATIONS, , Disp: , Rfl:      VITAMIN D3 25 MCG (1000 UT) tablet, Take 1 tablet by mouth daily, Disp: , Rfl:         Answers for HPI/ROS submitted by the patient on 5/10/2022  Review of Systems   Musculoskeletal: Positive for myalgias.   Psychiatric/Behavioral: The patient is nervous/anxious.           Objective    Vitals - Patient  Reported  Pain Score: Mild Pain (3)  Pain Loc: Other - see comment (mostly glute muscles, but also all over pain)        Physical Exam   GENERAL: Healthy, alert and no distress  EYES: Eyes grossly normal to inspection.  No discharge or erythema, or obvious scleral/conjunctival abnormalities.  HENT: Normal cephalic/atraumatic.  External ears, nose and mouth without ulcers or lesions.  No nasal drainage visible.  NECK: No asymmetry, visible masses or scars  RESP: No audible wheeze, cough, or visible cyanosis.  No visible retractions or increased work of breathing.    SKIN: Visible skin clear. No significant rash, abnormal pigmentation or lesions.  NEURO: Cranial nerves grossly intact.  Mentation and speech appropriate for age.  PSYCH: Mentation appears normal, affect normal/bright, judgement and insight intact, normal speech and appearance well-groomed.      SARS-CoV-2 Nucleocapsid Total Ab, S (no units)   Date Value   04/26/2022 Positive (A)          CRP Inflammation   Date Value Ref Range Status   04/26/2022 <2.9 0.0 - 8.0 mg/L Final      Erythrocyte Sedimentation Rate   Date Value Ref Range Status   04/26/2022 4 0 - 20 mm/hr Final      Last Renal Panel:  Potassium   Date Value Ref Range Status   06/09/2004 4.1 3.5 - 5.1 mmol/L      Comment:     [facility]     Glucose   Date Value Ref Range Status   02/17/2011 71 60 - 99 mg/dL Final     Creatinine   Date Value Ref Range Status   06/09/2004 0.8 0.6 - 1.3 mg/dL      Comment:     [facility]      No results found for: WBC  No results found for: RBC  Lab Results   Component Value Date    HGB 15.1 03/30/2011     Lab Results   Component Value Date    HCT 43.5 03/30/2011     No components found for: MCT  Lab Results   Component Value Date    MCV 91.5 03/30/2011     Lab Results   Component Value Date    MCH 31.8 03/30/2011     Lab Results   Component Value Date    MCHC 34.7 03/30/2011     Lab Results   Component Value Date    RDW 13.4  03/30/2011     No results found for: PLT   No results found for: A1C   TSH   Date Value Ref Range Status   01/26/2012 2.79 0.4 - 5.0 mU/L Final      No results found for: VITDT   No results for input(s): MAG in the last 57113 hours.  Last Comprehensive Metabolic Panel:  Potassium   Date Value Ref Range Status   06/09/2004 4.1 3.5 - 5.1 mmol/L      Comment:     [facility]     Glucose   Date Value Ref Range Status   02/17/2011 71 60 - 99 mg/dL Final     Creatinine   Date Value Ref Range Status   06/09/2004 0.8 0.6 - 1.3 mg/dL      Comment:     [facility]     ALT   Date Value Ref Range Status   06/09/2004 71 (A) 0 - 65 U/L      Comment:     [facility]     AST   Date Value Ref Range Status   06/09/2004 35 0 - 37 U/L      Comment:     [facility]     Most Recent D-dimer:No lab results found.    Office Visit on 04/26/2022   Component Date Value Ref Range Status     KETTY interpretation 04/26/2022 Negative  Negative Final      Negative:              <1:40  Borderline Positive:   1:40 - 1:80  Positive:              >1:80     CRP Inflammation 04/26/2022 <2.9  0.0 - 8.0 mg/L Final     Cyclic Citrullinated Peptide Antib* 04/26/2022 1.6  <7.0 U/mL Final    Negative     Erythrocyte Sedimentation Rate 04/26/2022 4  0 - 20 mm/hr Final     Rheumatoid Factor 04/26/2022 6  <12 IU/mL Final     Hepatitis C Antibody 04/26/2022 Nonreactive  Nonreactive Final     SARS-CoV-2 Nucleocapsid Total Ab, S 04/26/2022 Positive (A) Negative Final    SARS-CoV-2 antibodies detected. Results suggest recent   or prior SARS-CoV-2 infection. Correlation with   epidemiologic risk factors and other clinical and   laboratory findings is recommended. Serologic results   should not be used to diagnose recent SARS-CoV-2 infection.   Protective immunity cannot be inferred based on these   results alone. False positive results may occur due to   cross reactivity from pre-existing antibodies or    other possible causes.     -------------------ADDITIONAL INFORMATION-------------------  Testing was performed using the Roche Elecsys   Anti-SARS-CoV-2 Reagent assay from [employer],   which has received Emergency Use Authorization(EUA)  by the U.S. Food and Drug Administration.     Fact sheets for this Emergency Use Authorization (EUA)   assay can be found at the following links:      For Healthcare Providers:  https://www.fda.gov/media/803826/download  For Patients:  https://www.fda.gov/media/650897/download     Patient's Race 04/26/2022 White   Final     Patient's Ethnicity 04/26/2022 Not    Final       Test Performed by:  [address] [address]  : David Dillon M.D. Ph.D.; CLIA# [id number]     Labs and Imaging from [facility] and [facility] reviewed           Sincerely,    Nidhi Herbert PA-C

## 2022-05-14 ENCOUNTER — HEALTH MAINTENANCE LETTER (OUTPATIENT)
Age: 42
End: 2022-05-14

## 2022-05-19 ENCOUNTER — TELEPHONE (OUTPATIENT)
Dept: FAMILY MEDICINE | Facility: CLINIC | Age: 42
End: 2022-05-19
Payer: COMMERCIAL

## 2022-05-19 NOTE — TELEPHONE ENCOUNTER
Patient transferred to ask if needs to fast for her lab appointment.  Labs ordered by specialist.  Advised to be sure she can call ordering provider as I am not sure but usually just lipid or glucose that need to fast for.  Prerna Meyers RN

## 2022-05-23 ENCOUNTER — DOCUMENTATION ONLY (OUTPATIENT)
Dept: PHYSICAL MEDICINE AND REHAB | Facility: CLINIC | Age: 42
End: 2022-05-23
Payer: COMMERCIAL

## 2022-05-23 ENCOUNTER — LAB (OUTPATIENT)
Dept: LAB | Facility: CLINIC | Age: 42
End: 2022-05-23
Payer: COMMERCIAL

## 2022-05-23 DIAGNOSIS — M79.10 MYALGIA: ICD-10-CM

## 2022-05-23 DIAGNOSIS — U09.9 POST-COVID CHRONIC NEUROLOGIC SYMPTOMS: ICD-10-CM

## 2022-05-23 DIAGNOSIS — G62.9 NEUROPATHY: ICD-10-CM

## 2022-05-23 DIAGNOSIS — R29.90 POST-COVID CHRONIC NEUROLOGIC SYMPTOMS: ICD-10-CM

## 2022-05-23 LAB — VIT B12 SERPL-MCNC: 607 PG/ML (ref 193–986)

## 2022-05-23 PROCEDURE — 83921 ORGANIC ACID SINGLE QUANT: CPT

## 2022-05-23 PROCEDURE — 82607 VITAMIN B-12: CPT

## 2022-05-23 PROCEDURE — 36415 COLL VENOUS BLD VENIPUNCTURE: CPT

## 2022-05-23 PROCEDURE — 84207 ASSAY OF VITAMIN B-6: CPT | Mod: 90

## 2022-05-23 PROCEDURE — 99000 SPECIMEN HANDLING OFFICE-LAB: CPT

## 2022-05-23 NOTE — PROGRESS NOTES
Neurology  referral scheduled 8/23/22.    Follow-up in post covid clinic scheduled 8/8/22.    Valentín Hummel

## 2022-05-26 LAB
METHYLMALONATE SERPL-SCNC: 0.15 UMOL/L (ref 0–0.4)
PYRIDOXAL PHOS SERPL-SCNC: 124.2 NMOL/L

## 2022-06-16 ENCOUNTER — TELEPHONE (OUTPATIENT)
Dept: FAMILY MEDICINE | Facility: CLINIC | Age: 42
End: 2022-06-16
Payer: COMMERCIAL

## 2022-06-16 NOTE — TELEPHONE ENCOUNTER
Summary:    Patient is due/failing the following:   PAP    Reviewed:  [] CARE EVERYWHERE  [] LAST OV NOTE INCLUDING ENDO  [] FYI TAB  [] MYCHART ACTIVE?  [] LAST PANEL ENCOUNTER  [] FUTURE APPTS  [] IMMUNIZATIONS          Action needed:   Patient needs office visit for pap.    Type of outreach:    per chart had normal pap on 10/02/2020 at  and will send to [facility]                                                                               Dimple Kelly/LUIS  [place]---[facility]

## 2022-08-23 ENCOUNTER — OFFICE VISIT (OUTPATIENT)
Dept: NEUROLOGY | Facility: CLINIC | Age: 42
End: 2022-08-23
Attending: PHYSICIAN ASSISTANT
Payer: COMMERCIAL

## 2022-08-23 VITALS
WEIGHT: 139.2 LBS | HEIGHT: 65 IN | HEART RATE: 69 BPM | TEMPERATURE: 97.7 F | BODY MASS INDEX: 23.19 KG/M2 | SYSTOLIC BLOOD PRESSURE: 101 MMHG | DIASTOLIC BLOOD PRESSURE: 67 MMHG

## 2022-08-23 DIAGNOSIS — U09.9 POST-COVID CHRONIC NEUROLOGIC SYMPTOMS: ICD-10-CM

## 2022-08-23 DIAGNOSIS — G62.9 NEUROPATHY: ICD-10-CM

## 2022-08-23 DIAGNOSIS — R29.90 POST-COVID CHRONIC NEUROLOGIC SYMPTOMS: ICD-10-CM

## 2022-08-23 DIAGNOSIS — M79.10 MYALGIA: ICD-10-CM

## 2022-08-23 ASSESSMENT — PAIN SCALES - GENERAL: PAINLEVEL: MILD PAIN (3)

## 2022-08-23 NOTE — LETTER
8/23/2022     RE: Cherie Emanuel  [id number]  [facility] [address]     Dear Colleague,    Thank you for referring your patient, Cherie Emanuel, to the [facility] at [facility]. Please see a copy of my visit note below.    Chief Complaint: pain     History of Present Illness:    Cherie Emanuel is a 42 year old woman who we are seeing at the request of Nidhi Herbert for evaluation of pain with pressure and tingling of limbs. She presented with  who assisted with providing history.    Onset of pain was approximately two years ago (September 2020).  Prior to onset of pain, she had a couple of days with lost sense of taste and questionable blurry vision.  Several weeks later it was uncomfortable to lay down and she could not maintain a supine position or sitting position for prolonged periods of time.  Every surface felt hard and uncomfortable.  This interfered with her sleep.  Her feet would feel heavy and would feel uncomfortable on the tile floor.  Pain sensation has been generally stable but with intermittent exacerbations associated with COVID vaccination or infection.  Pain temporarily worsened after she had an acute COVID infection in 2/2022.  A couple of times she has lost her sense of taste accompanied by pain worsening but without any other overt respiratory symptoms.  Currently she feels pain with any pressure (ex. laptop on legs will trigger aching pain).  When she lays down, she feels a sensation of sheets chafing her limbs followed by deep achy pain with pressure.  She expressed worry that there may be small fiber neuropathy that contributes to pain.    She has tingling of hands and feet that has improved since time of onset.  Tingling started spring 2021 with simultaneous onset at hands and feet (up to calf level on both legs, mostly at hand level but occasional extension to forearm level).  Tingling was consistently present for many  months.  Tingling sensation started improving January or February 2022.  Tingling is almost resolved currently, she occasionally feels tingling in the bottoms of her feet.    Several months ago she developed sharp shooting pain in her hands and feet.  She would have sudden onset sharp pain in her hands and feet that would last less than a second.  These sensations would occur 20-30 times daily.  After starting fluvoxamine June 2022, the painful sensations sensations completely resolved.    She had numbness in left greater than right lateral thigh when she was taking a B vitamin complex for a couple of weeks.  Numbness resolved when she stopped taking the B vitamin.  She denies current numbness.    She has had constipation her whole life.  She experienced intermittent heat and cold insensitivity starting fall 2020 manifested as discomfort in hot tub, intolerance of sitting on the beach, intolerance of going out in the cold last winter.  She denies orthostatic dizziness or recent syncopal episodes.    She denies frequent cramps, fasciculations, or difficulty with muscle stiffness and muscle relaxation. Speech and swallowing are normal. Appetite is good and weight is relatively stable. She denies breathing difficulties or shortness of breath while lying flat. Mood and affect are appropriate. She is independent, has no assistive devices, able to ambulate independently, and is not falling. She denies sleep complaints or restless leg symptoms.     Prior pertinent laboratory work-up:  2/18/2021: normal vitamin B6, vitamin B12, vitamin E, copper, A1C, TSH, SPEP, ESR, CRP, KETTY, COLBY panel  11/9/2021: low vitamin D (26.8), normal ESR, CRP, rheumatoid factor, ANCA, folic acid, thiamine, LFTs, Lyme, myasthenia antibody panel, myomarker 3 antibody panel (anti-Charley-1, anti-PL-7, anti-PL-12, anti-EJ, anti-OJ, anti-SRP, anti-MI-2, anti-TIF-1gamma, anti-MDA-5, anti-NXP-2, anti-SAE1, anti-PM/SCL-100, anti-Ku, anti-SSA, anti-U1RNP,  anti-U2RNP, anti-U3RNP)  4/26/2022: normal KETTY, rheumatoid factor, CCP, CRP, ESR, tryptase, histamine, aldolase, hepatitis C, CK  5/23/2022: normal vitamin B6, MMA, B12    Prior pertinent radiology work-up:  3/21/2021: MRI brain wo/w contrast was normal (no raw images available)  5/3/2021: MRI cervical and thoracic spine showed T11-12 mild-moderate central stenosis secondary to an extruded central disc herniation abutting and flattening the ventral cord.  There was a C6-7 extruded left disc herniation moderately impingint hte left C7 nerve root, causing mild to moderate asymmetric left canal stenosis with left ventral cord abutment/flattening.  (no raw images available)  9/9/2021: MRI cervical spine without contrast showed unchanged C6-7 disc bulge and mild spinal canal stenosis and mild/moderate left foraminal narrowing (no raw images available)    Prior electrophysiologic work-up:  6/29/2021: Nerve conduction studies/EMG performed by Dr. Torres of the left upper and lower extremities were normal    Past Medical History:   insomnia    Past Surgical History:  None    Family history:    There is no known family history of hereditary neuropathies or other neuromuscular disorders.    Social History:    Lives at home with  and children (ages 8, 6, and 4 as of August 2022).  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  She denies tobacco, alcohol, or illicit drug use. There is no known exposure to toxins or heavy metals.    Medical Allergies:  NKDA     Current Medications:   Current Outpatient Medications:      fluvoxaMINE (LUVOX) 50 MG tablet, Take 50 mg by mouth, Disp: , Rfl:      gabapentin (NEURONTIN) 100 MG capsule, , Disp: , Rfl:      magnesium carbonate (MAGONATE) 200 mg/ml liquid, , Disp: , Rfl:      acetaminophen (TYLENOL) 500 MG tablet, , Disp: , Rfl:      ibuprofen (ADVIL/MOTRIN) 200 MG tablet, , Disp: , Rfl:      NO ACTIVE MEDICATIONS, , Disp: , Rfl:      VITAMIN D3 25 MCG (1000 UT) tablet, Take 1  "tablet by mouth daily, Disp: , Rfl:      Review of Systems: A complete review of systems was obtained and was negative except for what was noted above.    Physical examination:    /67 (BP Location: Right arm, Patient Position: Sitting, Cuff Size: Adult Regular)   Pulse 69   Temp 97.7  F (36.5  C) (Temporal)   Ht 1.651 m (5' 5\")   Wt 63.1 kg (139 lb 3.2 oz)   Breastfeeding No   BMI 23.16 kg/m       General Appearance: NAD    Skin: There are no rashes or other skin lesions on exposed skin.    Neurologic examination:    Mental status:  Patient is alert, attentive, and oriented x 3.  Language is coherent and fluent without aphasia.  Memory, comprehension and ability to follow commands were intact.       Cranial nerves:   Pupils were round and reacted to light.  Extraocular movements were full. There was no face, jaw, palate or tongue weakness or atrophy. Hearing was grossly intact.  Shoulder shrug was normal.       Motor exam: No atrophy or fasciculations.  Manual muscle testing revealed MRC grade 5/5 strength throughout including proximal and distal muscles of the arms and legs.    Complex motor skills: No tremor or ataxia    Sensory exam revealed normal perception of vibration, proprioception, light touch, pin, and temperature proximally and distally in the arms and legs bilaterally. Romberg sign was absent.    Gait: Narrow and stable.  She was able to walk on his heels, toes and tandem without any difficulty.       Deep tendon reflexes:   Right Left   Triceps 2 2   Biceps 2 2   Brachioradialis 2 2   Knee jerk 2 2   Ankle jerk 2 2   Plantar responses were flexor bilaterally.       Assessment:    Cherie Emanuel is a 42 year old woman who presented in consultation for pain with pressure and tingling of limbs.  Onset of pain was several weeks after her initial suspected COVID-19 infection with lost sense of taste (though tested negative at that time).  Current pain of concern is experienced following tactile " pressure and deep pressure.  She previously experienced tingling and shooting pains in the past but these have improved.  Prior extensive laboratory workup, imaging, and EMG as described above were all unrevealing for etiology of pain.  Neurological exam today was normal.  There have been a few cases in the literature (such as DOI 10.1002/mus.92275) describing cases of small fiber neuropathy symptoms following acute COVID-19 infection.  The normal sensory exam is reassuring from a small fiber neuropathy standpoint, as these cases generally improve with symptomatic therapy.  We discussed obtaining a skin biopsy to objectively evaluate for small fiber neuropathy.  A normal skin biopsy finding in this context would be prognostically favorable. In that event would encourage her to continue to work with her PCP to explore other causes of pain. Deep palpation provoked pain is more compatible with fibromyalgia than small fiber neuropathy.    Plan:      1. Skin biopsy of right calf site for IENFD (if SFN explained widespread pain it is likely to be reflected in the skin biopsy sampled at the calf).  She will be visiting [place] in the near future, ok to schedule biopsy after that vacation.  2. Defer pain management to post-COVID clinic and her PCP. for management of myalgias.    Pat Garcia MD  CNP Fellow    Patient was seen and discussed with Dr. Reina. His addendum follows.   --  ADDENDUM:  I personally interviewed and examined the patient. I agree with the history, physical, assessment, and plan as documented above. Changes to the physical examination, assessment and plan have been incorporated into the note by myself, as to make it a single cohesive document.    Mikhail Reina MD

## 2022-08-23 NOTE — PROGRESS NOTES
Chief Complaint: pain     History of Present Illness:    Cherie Emanuel is a 42 year old woman who we are seeing at the request of Nidhi Herbert for evaluation of pain with pressure and tingling of limbs. She presented with  who assisted with providing history.    Onset of pain was approximately two years ago (September 2020).  Prior to onset of pain, she had a couple of days with lost sense of taste and questionable blurry vision.  Several weeks later it was uncomfortable to lay down and she could not maintain a supine position or sitting position for prolonged periods of time.  Every surface felt hard and uncomfortable.  This interfered with her sleep.  Her feet would feel heavy and would feel uncomfortable on the tile floor.  Pain sensation has been generally stable but with intermittent exacerbations associated with COVID vaccination or infection.  Pain temporarily worsened after she had an acute COVID infection in 2/2022.  A couple of times she has lost her sense of taste accompanied by pain worsening but without any other overt respiratory symptoms.  Currently she feels pain with any pressure (ex. laptop on legs will trigger aching pain).  When she lays down, she feels a sensation of sheets chafing her limbs followed by deep achy pain with pressure.  She expressed worry that there may be small fiber neuropathy that contributes to pain.    She has tingling of hands and feet that has improved since time of onset.  Tingling started spring 2021 with simultaneous onset at hands and feet (up to calf level on both legs, mostly at hand level but occasional extension to forearm level).  Tingling was consistently present for many months.  Tingling sensation started improving January or February 2022.  Tingling is almost resolved currently, she occasionally feels tingling in the bottoms of her feet.    Several months ago she developed sharp shooting pain in her hands and feet.  She would have sudden onset sharp  pain in her hands and feet that would last less than a second.  These sensations would occur 20-30 times daily.  After starting fluvoxamine June 2022, the painful sensations sensations completely resolved.    She had numbness in left greater than right lateral thigh when she was taking a B vitamin complex for a couple of weeks.  Numbness resolved when she stopped taking the B vitamin.  She denies current numbness.    She has had constipation her whole life.  She experienced intermittent heat and cold insensitivity starting fall 2020 manifested as discomfort in hot tub, intolerance of sitting on the beach, intolerance of going out in the cold last winter.  She denies orthostatic dizziness or recent syncopal episodes.    She denies frequent cramps, fasciculations, or difficulty with muscle stiffness and muscle relaxation. Speech and swallowing are normal. Appetite is good and weight is relatively stable. She denies breathing difficulties or shortness of breath while lying flat. Mood and affect are appropriate. She is independent, has no assistive devices, able to ambulate independently, and is not falling. She denies sleep complaints or restless leg symptoms.     Prior pertinent laboratory work-up:  2/18/2021: normal vitamin B6, vitamin B12, vitamin E, copper, A1C, TSH, SPEP, ESR, CRP, KETTY, COLBY panel  11/9/2021: low vitamin D (26.8), normal ESR, CRP, rheumatoid factor, ANCA, folic acid, thiamine, LFTs, Lyme, myasthenia antibody panel, myomarker 3 antibody panel (anti-Charley-1, anti-PL-7, anti-PL-12, anti-EJ, anti-OJ, anti-SRP, anti-MI-2, anti-TIF-1gamma, anti-MDA-5, anti-NXP-2, anti-SAE1, anti-PM/SCL-100, anti-Ku, anti-SSA, anti-U1RNP, anti-U2RNP, anti-U3RNP)  4/26/2022: normal KETTY, rheumatoid factor, CCP, CRP, ESR, tryptase, histamine, aldolase, hepatitis C, CK  5/23/2022: normal vitamin B6, MMA, B12    Prior pertinent radiology work-up:  3/21/2021: MRI brain wo/w contrast was normal (no raw images available)  5/3/2021:  MRI cervical and thoracic spine showed T11-12 mild-moderate central stenosis secondary to an extruded central disc herniation abutting and flattening the ventral cord.  There was a C6-7 extruded left disc herniation moderately impingint hte left C7 nerve root, causing mild to moderate asymmetric left canal stenosis with left ventral cord abutment/flattening.  (no raw images available)  9/9/2021: MRI cervical spine without contrast showed unchanged C6-7 disc bulge and mild spinal canal stenosis and mild/moderate left foraminal narrowing (no raw images available)    Prior electrophysiologic work-up:  6/29/2021: Nerve conduction studies/EMG performed by Dr. Torres of the left upper and lower extremities were normal    Past Medical History:   insomnia    Past Surgical History:  None    Family history:    There is no known family history of hereditary neuropathies or other neuromuscular disorders.    Social History:    Lives at home with  and children (ages 8, 6, and 4 as of August 2022).  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  She denies tobacco, alcohol, or illicit drug use. There is no known exposure to toxins or heavy metals.    Medical Allergies:  NKDA     Current Medications:   Current Outpatient Medications:      fluvoxaMINE (LUVOX) 50 MG tablet, Take 50 mg by mouth, Disp: , Rfl:      gabapentin (NEURONTIN) 100 MG capsule, , Disp: , Rfl:      magnesium carbonate (MAGONATE) 200 mg/ml liquid, , Disp: , Rfl:      acetaminophen (TYLENOL) 500 MG tablet, , Disp: , Rfl:      ibuprofen (ADVIL/MOTRIN) 200 MG tablet, , Disp: , Rfl:      NO ACTIVE MEDICATIONS, , Disp: , Rfl:      VITAMIN D3 25 MCG (1000 UT) tablet, Take 1 tablet by mouth daily, Disp: , Rfl:      Review of Systems: A complete review of systems was obtained and was negative except for what was noted above.    Physical examination:    /67 (BP Location: Right arm, Patient Position: Sitting, Cuff Size: Adult Regular)   Pulse 69   Temp  "97.7  F (36.5  C) (Temporal)   Ht 1.651 m (5' 5\")   Wt 63.1 kg (139 lb 3.2 oz)   Breastfeeding No   BMI 23.16 kg/m       General Appearance: NAD    Skin: There are no rashes or other skin lesions on exposed skin.    Neurologic examination:    Mental status:  Patient is alert, attentive, and oriented x 3.  Language is coherent and fluent without aphasia.  Memory, comprehension and ability to follow commands were intact.       Cranial nerves:   Pupils were round and reacted to light.  Extraocular movements were full. There was no face, jaw, palate or tongue weakness or atrophy. Hearing was grossly intact.  Shoulder shrug was normal.       Motor exam: No atrophy or fasciculations.  Manual muscle testing revealed MRC grade 5/5 strength throughout including proximal and distal muscles of the arms and legs.    Complex motor skills: No tremor or ataxia    Sensory exam revealed normal perception of vibration, proprioception, light touch, pin, and temperature proximally and distally in the arms and legs bilaterally. Romberg sign was absent.    Gait: Narrow and stable.  She was able to walk on his heels, toes and tandem without any difficulty.       Deep tendon reflexes:   Right Left   Triceps 2 2   Biceps 2 2   Brachioradialis 2 2   Knee jerk 2 2   Ankle jerk 2 2   Plantar responses were flexor bilaterally.       Assessment:    Cherie Emanuel is a 42 year old woman who presented in consultation for pain with pressure and tingling of limbs.  Onset of pain was several weeks after her initial suspected COVID-19 infection with lost sense of taste (though tested negative at that time).  Current pain of concern is experienced following tactile pressure and deep pressure.  She previously experienced tingling and shooting pains in the past but these have improved.  Prior extensive laboratory workup, imaging, and EMG as described above were all unrevealing for etiology of pain.  Neurological exam today was normal.  There have been " a few cases in the literature (such as DOI 10.1002/mus.08066) describing cases of small fiber neuropathy symptoms following acute COVID-19 infection.  The normal sensory exam is reassuring from a small fiber neuropathy standpoint, as these cases generally improve with symptomatic therapy.  We discussed obtaining a skin biopsy to objectively evaluate for small fiber neuropathy.  A normal skin biopsy finding in this context would be prognostically favorable. In that event would encourage her to continue to work with her PCP to explore other causes of pain. Deep palpation provoked pain is more compatible with fibromyalgia than small fiber neuropathy.    Plan:      1. Skin biopsy of right calf site for IENFD (if SFN explained widespread pain it is likely to be reflected in the skin biopsy sampled at the calf).  She will be visiting [place] in the near future, ok to schedule biopsy after that vacation.  2. Defer pain management to post-COVID clinic and her PCP. for management of myalgias.    Pat Garcia MD  CNP Fellow    Patient was seen and discussed with Dr. Reina. His addendum follows.   -  ADDENDUM:  I personally interviewed and examined the patient. I agree with the history, physical, assessment, and plan as documented above. Changes to the physical examination, assessment and plan have been incorporated into the note by myself, as to make it a single cohesive document.    Mikhail Reina MD    10/11/22: Skin biopsy dated 9/14/22 showed normal calf IENFD. Finding argues against a SFN and is prognostically favorable. Will communicate results to patient.

## 2022-09-03 ENCOUNTER — HEALTH MAINTENANCE LETTER (OUTPATIENT)
Age: 42
End: 2022-09-03

## 2022-09-12 ASSESSMENT — ANXIETY QUESTIONNAIRES
1. FEELING NERVOUS, ANXIOUS, OR ON EDGE: SEVERAL DAYS
8. IF YOU CHECKED OFF ANY PROBLEMS, HOW DIFFICULT HAVE THESE MADE IT FOR YOU TO DO YOUR WORK, TAKE CARE OF THINGS AT HOME, OR GET ALONG WITH OTHER PEOPLE?: SOMEWHAT DIFFICULT
2. NOT BEING ABLE TO STOP OR CONTROL WORRYING: SEVERAL DAYS
7. FEELING AFRAID AS IF SOMETHING AWFUL MIGHT HAPPEN: NOT AT ALL
7. FEELING AFRAID AS IF SOMETHING AWFUL MIGHT HAPPEN: NOT AT ALL
GAD7 TOTAL SCORE: 4
5. BEING SO RESTLESS THAT IT IS HARD TO SIT STILL: NOT AT ALL
6. BECOMING EASILY ANNOYED OR IRRITABLE: NOT AT ALL
4. TROUBLE RELAXING: SEVERAL DAYS
GAD7 TOTAL SCORE: 4
3. WORRYING TOO MUCH ABOUT DIFFERENT THINGS: SEVERAL DAYS
IF YOU CHECKED OFF ANY PROBLEMS ON THIS QUESTIONNAIRE, HOW DIFFICULT HAVE THESE PROBLEMS MADE IT FOR YOU TO DO YOUR WORK, TAKE CARE OF THINGS AT HOME, OR GET ALONG WITH OTHER PEOPLE: SOMEWHAT DIFFICULT
GAD7 TOTAL SCORE: 4

## 2022-09-12 ASSESSMENT — ENCOUNTER SYMPTOMS
BACK PAIN: 0
SINUS PAIN: 0
HEADACHES: 0
HOARSE VOICE: 0
SORE THROAT: 0
INSOMNIA: 1
DIZZINESS: 0
SMELL DISTURBANCE: 0
BREAST MASS: 0
PARALYSIS: 0
MUSCLE WEAKNESS: 0
MUSCLE CRAMPS: 0
ARTHRALGIAS: 0
TINGLING: 1
DEPRESSION: 1
TASTE DISTURBANCE: 1
BREAST PAIN: 1
SINUS CONGESTION: 0
DECREASED CONCENTRATION: 0
WEAKNESS: 0
JOINT SWELLING: 0
MYALGIAS: 1
LOSS OF CONSCIOUSNESS: 0
NECK PAIN: 0
TROUBLE SWALLOWING: 0
STIFFNESS: 0
SPEECH CHANGE: 0
MEMORY LOSS: 0
TREMORS: 0
NERVOUS/ANXIOUS: 1
NUMBNESS: 0
PANIC: 0
SEIZURES: 0
NECK MASS: 0
DISTURBANCES IN COORDINATION: 0

## 2022-09-12 ASSESSMENT — PATIENT HEALTH QUESTIONNAIRE - PHQ9
SUM OF ALL RESPONSES TO PHQ QUESTIONS 1-9: 3
SUM OF ALL RESPONSES TO PHQ QUESTIONS 1-9: 3
10. IF YOU CHECKED OFF ANY PROBLEMS, HOW DIFFICULT HAVE THESE PROBLEMS MADE IT FOR YOU TO DO YOUR WORK, TAKE CARE OF THINGS AT HOME, OR GET ALONG WITH OTHER PEOPLE: SOMEWHAT DIFFICULT

## 2022-09-13 ENCOUNTER — VIRTUAL VISIT (OUTPATIENT)
Dept: PHYSICAL MEDICINE AND REHAB | Facility: CLINIC | Age: 42
End: 2022-09-13
Payer: COMMERCIAL

## 2022-09-13 DIAGNOSIS — M79.10 MYALGIA: Primary | ICD-10-CM

## 2022-09-13 DIAGNOSIS — U09.9 LONG COVID: ICD-10-CM

## 2022-09-13 DIAGNOSIS — G62.9 NEUROPATHY: ICD-10-CM

## 2022-09-13 DIAGNOSIS — R29.90 POST-COVID CHRONIC NEUROLOGIC SYMPTOMS: ICD-10-CM

## 2022-09-13 DIAGNOSIS — U09.9 POST-COVID CHRONIC NEUROLOGIC SYMPTOMS: ICD-10-CM

## 2022-09-13 PROCEDURE — 99215 OFFICE O/P EST HI 40 MIN: CPT | Mod: 95 | Performed by: PHYSICIAN ASSISTANT

## 2022-09-13 ASSESSMENT — ENCOUNTER SYMPTOMS
SEIZURES: 0
HEADACHES: 0
NERVOUS/ANXIOUS: 1
NUMBNESS: 0
NECK PAIN: 0
JOINT SWELLING: 0
TROUBLE SWALLOWING: 0
BACK PAIN: 0
MYALGIAS: 1
BREAST MASS: 0
SORE THROAT: 0
DIZZINESS: 0
TREMORS: 0
WEAKNESS: 0
ARTHRALGIAS: 0
SINUS PAIN: 0
DECREASED CONCENTRATION: 0

## 2022-09-13 NOTE — LETTER
Date:September 13, 2022      Provider requested that no letter be sent. Do not send.       [facility]

## 2022-09-13 NOTE — LETTER
9/13/2022       RE: Cherie Emanuel  [address]  [facility] [id number]     Dear Colleague,    Thank you for referring your patient, Cherie Emanuel, to the [facility] [place] [facility]. Please see a copy of my visit note below.    Cherie Emanuel  is being evaluated via a billable video visit.      Patient denies any changes since echeck-in regarding medication and allergies and states all information entered during echeck-in remains accurate.    How would you like to obtain your AVS? MyChart  For the video visit, send the invitation by: Text to cell phone: [phone]  Will anyone else be joining your video visit? No      Assessment/Impression   1. Myalgia  Patient still with muscle/nerve pain that is deep pain. She has seen both Neurology and rheumatology.  Per Rheumatology note which was reviewed did not think she qualified for Fibromyalgia but had traits consistent.  Recommenced acupuncture and referral placed.    - Acupuncture Referral; Future     2. Neuropathy/Post-COVID chronic neurologic symptoms  Appreciate neurology and will wait for results for Nerve biopsy.  Discussed with patient if nerve biopsy is normal will consider switching patient from fluvoxamine and gabapentin to Cymbalta instead to see if this will help with her pain. She will be in contact with me regarding her gabapentin and if it is effective.  Discussed to take the Gabapentin 2 hrs prior to her magnesium.   - Acupuncture Referral; Future    3. Long COVID  Discussed COVID and Post COVID with patient.  Educational materials provided and all questions answered.    Plan:  1. I reviewed present knowledge on long-Covid.  Education was provided and question were answered.  2. Orders/Referrals as above  3. I will advised patient on test results  4. I will follow up with Cherie Emanuel in 2 months. I will review progress and consider need  "for any other therapeutic interventions. If there are any questions and/or concerns she will call the clinic.      On day of encounter time spent in chart review and with patient in consultation, exam, education,coordination of care, and documentation: 40 minutes     _________________________________  Nidhi Herbert PA-C  [facility] PH[facility]ICAL MEDICINE [facility]IL[facility] [place]    Subjective   HPI   Briefly patient was seen on 5/12/22 in the Post COVID clinic for lingering symptoms from their COVID infection in February 2022. At the time of that appointment they were complaining of myalgias and neuropathy. She returns today for a follow up.  Patient saw Rheumatology and per note the exam was not consistent with fibromyalgia.  She is seen Dr. Reina tomorrow for a skin biopsy to evaluate for small fiber neuropathy. She also notices that a sheet feels \"rough on her skin\".  She does still have tingling in her calf's and feet. She has also noticed she will intermittently loose her sense of taste.  Patient tried gabapentin and tried the pain worse and was tried also on fluvoxamine.  She has not tried Cymbalta.  She is not sleeping well due the pain.     Current Symptoms:  Muscle aches/joint aches: stable/ ( worse when laying down)    Goals of Care: improve muscle pain    Current concerns: No    PHQ Assesment Total Score(s) 9/12/2022   PHQ-2 Score 1   PHQ-9 Score 3   Some recent data might be hidden     SANDRA-7 Results 9/12/2022   SANDRA 7 TOTAL SCORE 4 (minimal anxiety)   Some recent data might be hidden     PTSD Screen Score 9/12/2022   Have you ever experienced this kind of event? No   Some recent data might be hidden     PROMIS-29 5/10/2022   PROMIS Physical Function T-Score 41.8 (mild dysfunction)   PROMIS Anxiety T-Score 63.4 (moderate)   PROMIS Depression T-Score 57.3 (mild)   PROMIS Fatigue T-Score 57 (mild)   PROMIS Sleep Disturbance T-Score 54.3 (within normal limits)   PROMIS Ability to " Participate in Social Roles & Activities T-Score 46.4 (within normal limits)   PROMIS Pain Interference T-Score 68 (moderate)   PROMIS Pain Intensity 5   \  Past Medical History:   Diagnosis Date     Depression     took fluoxetine for 1 year. Stopped due to conception concerns. stable off the        Past Surgical History:   Procedure Laterality Date     MOUTH SURGERY      wisdom teeth       Family History   Problem Relation Age of Onset     Diabetes Maternal Grandfather         overweight     Heart Failure Maternal Grandfather      Cancer Paternal Grandmother         ? breast cancer     C.A.D. Paternal Grandfather          of MI in 70's       Social History     Tobacco Use     Smoking status: Never Smoker     Smokeless tobacco: Never Used   Vaping Use     Vaping Use: Never used   Substance Use Topics     Alcohol use: Yes     Comment: occ, maybe glass of wine per week     Drug use: Never         Current Outpatient Medications:      fluvoxaMINE (LUVOX) 50 MG tablet, Take 50 mg by mouth, Disp: , Rfl:      magnesium carbonate (MAGONATE) 200 mg/ml liquid, , Disp: , Rfl:      NO ACTIVE MEDICATIONS, , Disp: , Rfl:      acetaminophen (TYLENOL) 500 MG tablet, , Disp: , Rfl:      gabapentin (NEURONTIN) 100 MG capsule, , Disp: , Rfl:      ibuprofen (ADVIL/MOTRIN) 200 MG tablet, , Disp: , Rfl:      VITAMIN D3 25 MCG (1000 UT) tablet, Take 1 tablet by mouth daily, Disp: , Rfl:     Answers to the ROS/HPI submitted by patient on 22  Review of Systems   HENT: Negative for ear discharge, ear pain, hearing loss, mouth sores, nosebleeds, sinus pain, sore throat, tinnitus and trouble swallowing.    Breasts:  Negative for breast mass and discharge.   Musculoskeletal: Positive for myalgias. Negative for arthralgias, back pain, joint swelling and neck pain.   Neurological: Negative for dizziness, tremors, seizures, weakness, numbness and headaches.   Psychiatric/Behavioral: Negative for decreased concentration. The patient is  nervous/anxious.         Objective    Vitals - Patient Reported  Weight (Patient Reported): 63.5 kg (140 lb)  Pain Score: Mild Pain (3)  Pain Loc: Other - see comment (pressure induced, laying down or sitting down)       Objective         Vitals:  No vitals were obtained today due to virtual visit.    Physical Exam   GENERAL: Healthy, alert and no distress  EYES: Eyes grossly normal to inspection.  No discharge or erythema, or obvious scleral/conjunctival abnormalities.  RESP: No audible wheeze, cough, or visible cyanosis.  No visible retractions or increased work of breathing.    SKIN: Visible skin clear. No significant rash, abnormal pigmentation or lesions.  NEURO: Cranial nerves grossly intact.  Mentation and speech appropriate for age.  PSYCH: Mentation appears normal, affect normal/bright, judgement and insight intact, normal speech and appearance well-groomed.        SARS-CoV-2 Nucleocapsid Total Ab, S (no units)   Date Value   04/26/2022 Positive (A)          CRP Inflammation   Date Value Ref Range Status   04/26/2022 <2.9 0.0 - 8.0 mg/L Final      Erythrocyte Sedimentation Rate   Date Value Ref Range Status   04/26/2022 4 0 - 20 mm/hr Final      Last Renal Panel:  Potassium   Date Value Ref Range Status   06/09/2004 4.1 3.5 - 5.1 mmol/L      Comment:     [facility]     Glucose   Date Value Ref Range Status   02/17/2011 71 60 - 99 mg/dL Final     Creatinine   Date Value Ref Range Status   06/09/2004 0.8 0.6 - 1.3 mg/dL      Comment:     [facility]      No results found for: WBC  No results found for: RBC  Lab Results   Component Value Date    HGB 15.1 03/30/2011     Lab Results   Component Value Date    HCT 43.5 03/30/2011     No components found for: MCT  Lab Results   Component Value Date    MCV 91.5 03/30/2011     Lab Results   Component Value Date    MCH 31.8 03/30/2011     Lab Results   Component Value Date    MCHC 34.7 03/30/2011     Lab Results   Component Value Date     RDW 13.4 03/30/2011     No results found for: PLT   No results found for: A1C   TSH   Date Value Ref Range Status   01/26/2012 2.79 0.4 - 5.0 mU/L Final      No results found for: VITDT   No results for input(s): MAG in the last 33348 hours.  Last Comprehensive Metabolic Panel:  Potassium   Date Value Ref Range Status   06/09/2004 4.1 3.5 - 5.1 mmol/L      Comment:     [facility]     Glucose   Date Value Ref Range Status   02/17/2011 71 60 - 99 mg/dL Final     Creatinine   Date Value Ref Range Status   06/09/2004 0.8 0.6 - 1.3 mg/dL      Comment:     [facility]     ALT   Date Value Ref Range Status   06/09/2004 71 (A) 0 - 65 U/L      Comment:     [facility]     AST   Date Value Ref Range Status   06/09/2004 35 0 - 37 U/L      Comment:     [facility]     Most Recent D-dimer:No lab results found.    Lab on 05/23/2022   Component Date Value Ref Range Status     Vitamin B12 05/23/2022 607  193 - 986 pg/mL Final     Methylmalonic Acid 05/23/2022 0.15  0.00 - 0.40 umol/L Final    INTERPRETIVE INFORMATION:    Slight elevation 0.41-0.99 umol/L is consistent with mild vitamin B12 deficiency, renal insufficiency, or intravascular volume contraction.    Moderate elevation 1.00-9.99 umol/L is consistent with mild vitamin B12 deficiency.    Massive elevation 10.00 umol/L or greater is consistent with significant vitamin B12 deficiency or with inborn errors of metabolism.     Vitamin B6 05/23/2022 124.2  20.0 - 125.0 nmol/L Final    INTERPRETIVE INFORMATION: Vitamin B6 (Pyridoxal 5-Phosphate)    Pyridoxal 5'-phosphate measured in a specimen collected   following an 8-hour or overnight fast accurately indicates   vitamin B6 nutritional status. Non-fasting specimen   concentration reflects recent vitamin intake.    This test was developed and its performance characteristics   determined by [facility]. It has not been cleared or   approved by the [place] Food  and Drug Administration. This test   was performed in a CLIA certified laboratory and is   intended for clinical purposes.  Performed By: [facility]  [address] [place] [id number]  : Sita Garcia MD       Video-Visit Details    Video Visit start Time: 1:30PM    Type of service:  Video Visit    Video End Time:2:00 PM    Originating Location (pt. Location): Home    Distant Location (provider location):  [facility] PHYSICAL MEDICINE AND REHABILITATION CLINIC [place]     Platform used for Video Visit: [employer]      Again, thank you for allowing me to participate in the care of your patient.      Sincerely,    Nidhi Herbert PA-C

## 2022-09-13 NOTE — PROGRESS NOTES
Cherie Emanuel  is being evaluated via a billable video visit.      Patient denies any changes since echeck-in regarding medication and allergies and states all information entered during echeck-in remains accurate.    How would you like to obtain your AVS? EVOFEM  For the video visit, send the invitation by: Text to cell phone: [phone]  Will anyone else be joining your video visit? No      Assessment/Impression   1. Myalgia  Patient still with muscle/nerve pain that is deep pain. She has seen both Neurology and rheumatology.  Per Rheumatology note which was reviewed did not think she qualified for Fibromyalgia but had traits consistent.  Recommenced acupuncture and referral placed.    - Acupuncture Referral; Future     2. Neuropathy/Post-COVID chronic neurologic symptoms  Appreciate neurology and will wait for results for Nerve biopsy.  Discussed with patient if nerve biopsy is normal will consider switching patient from fluvoxamine and gabapentin to Cymbalta instead to see if this will help with her pain. She will be in contact with me regarding her gabapentin and if it is effective.  Discussed to take the Gabapentin 2 hrs prior to her magnesium.   - Acupuncture Referral; Future    3. Long COVID  Discussed COVID and Post COVID with patient.  Educational materials provided and all questions answered.    Plan:  1. I reviewed present knowledge on long-Covid.  Education was provided and question were answered.  2. Orders/Referrals as above  3. I will advised patient on test results  4. I will follow up with Cherie Emanuel in 2 months. I will review progress and consider need for any other therapeutic interventions. If there are any questions and/or concerns she will call the clinic.      On day of encounter time spent in chart review and with patient in consultation, exam, education,coordination of care, and documentation: 40 minutes     _________________________________  [facility]  "[facility] [place]    Subjective   HPI   Briefly patient was seen on 5/12/22 in the Post COVID clinic for lingering symptoms from their COVID infection in February 2022. At the time of that appointment they were complaining of myalgias and neuropathy. She returns today for a follow up.  Patient saw Rheumatology and per note the exam was not consistent with fibromyalgia.  She is seen Dr. Reina tomorrow for a skin biopsy to evaluate for small fiber neuropathy. She also notices that a sheet feels \"rough on her skin\".  She does still have tingling in her calf's and feet. She has also noticed she will intermittently loose her sense of taste.  Patient tried gabapentin and tried the pain worse and was tried also on fluvoxamine.  She has not tried Cymbalta.  She is not sleeping well due the pain.     Current Symptoms:  Muscle aches/joint aches: stable/ ( worse when laying down)    Goals of Care: improve muscle pain    Current concerns: No    PHQ Assesment Total Score(s) 9/12/2022   PHQ-2 Score 1   PHQ-9 Score 3   Some recent data might be hidden     SANDRA-7 Results 9/12/2022   SANDRA 7 TOTAL SCORE 4 (minimal anxiety)   Some recent data might be hidden     PTSD Screen Score 9/12/2022   Have you ever experienced this kind of event? No   Some recent data might be hidden     PROMIS-29 5/10/2022   PROMIS Physical Function T-Score 41.8 (mild dysfunction)   PROMIS Anxiety T-Score 63.4 (moderate)   PROMIS Depression T-Score 57.3 (mild)   PROMIS Fatigue T-Score 57 (mild)   PROMIS Sleep Disturbance T-Score 54.3 (within normal limits)   PROMIS Ability to Participate in Social Roles & Activities T-Score 46.4 (within normal limits)   PROMIS Pain Interference T-Score 68 (moderate)   PROMIS Pain Intensity 5   \  Past Medical History:   Diagnosis Date     Depression 2010    took fluoxetine for 1 year. Stopped due to conception concerns. stable off the        Past Surgical History:   Procedure " Laterality Date     MOUTH SURGERY      wisdom teeth       Family History   Problem Relation Age of Onset     Diabetes Maternal Grandfather         overweight     Heart Failure Maternal Grandfather      Cancer Paternal Grandmother         ? breast cancer     C.A.D. Paternal Grandfather          of MI in 70's       Social History     Tobacco Use     Smoking status: Never Smoker     Smokeless tobacco: Never Used   Vaping Use     Vaping Use: Never used   Substance Use Topics     Alcohol use: Yes     Comment: occ, maybe glass of wine per week     Drug use: Never         Current Outpatient Medications:      fluvoxaMINE (LUVOX) 50 MG tablet, Take 50 mg by mouth, Disp: , Rfl:      magnesium carbonate (MAGONATE) 200 mg/ml liquid, , Disp: , Rfl:      NO ACTIVE MEDICATIONS, , Disp: , Rfl:      acetaminophen (TYLENOL) 500 MG tablet, , Disp: , Rfl:      gabapentin (NEURONTIN) 100 MG capsule, , Disp: , Rfl:      ibuprofen (ADVIL/MOTRIN) 200 MG tablet, , Disp: , Rfl:      VITAMIN D3 25 MCG (1000 UT) tablet, Take 1 tablet by mouth daily, Disp: , Rfl:     Answers to the ROS/HPI submitted by patient on 22  Review of Systems   HENT: Negative for ear discharge, ear pain, hearing loss, mouth sores, nosebleeds, sinus pain, sore throat, tinnitus and trouble swallowing.    Breasts:  Negative for breast mass and discharge.   Musculoskeletal: Positive for myalgias. Negative for arthralgias, back pain, joint swelling and neck pain.   Neurological: Negative for dizziness, tremors, seizures, weakness, numbness and headaches.   Psychiatric/Behavioral: Negative for decreased concentration. The patient is nervous/anxious.         Objective    Vitals - Patient Reported  Weight (Patient Reported): 63.5 kg (140 lb)  Pain Score: Mild Pain (3)  Pain Loc: Other - see comment (pressure induced, laying down or sitting down)       Objective         Vitals:  No vitals were obtained today due to virtual visit.    Physical Exam   GENERAL: Healthy,  alert and no distress  EYES: Eyes grossly normal to inspection.  No discharge or erythema, or obvious scleral/conjunctival abnormalities.  RESP: No audible wheeze, cough, or visible cyanosis.  No visible retractions or increased work of breathing.    SKIN: Visible skin clear. No significant rash, abnormal pigmentation or lesions.  NEURO: Cranial nerves grossly intact.  Mentation and speech appropriate for age.  PSYCH: Mentation appears normal, affect normal/bright, judgement and insight intact, normal speech and appearance well-groomed.        SARS-CoV-2 Nucleocapsid Total Ab, S (no units)   Date Value   04/26/2022 Positive (A)          CRP Inflammation   Date Value Ref Range Status   04/26/2022 <2.9 0.0 - 8.0 mg/L Final      Erythrocyte Sedimentation Rate   Date Value Ref Range Status   04/26/2022 4 0 - 20 mm/hr Final      Last Renal Panel:  Potassium   Date Value Ref Range Status   06/09/2004 4.1 3.5 - 5.1 mmol/L      Comment:     [facility]     Glucose   Date Value Ref Range Status   02/17/2011 71 60 - 99 mg/dL Final     Creatinine   Date Value Ref Range Status   06/09/2004 0.8 0.6 - 1.3 mg/dL      Comment:     [facility]      No results found for: WBC  No results found for: RBC  Lab Results   Component Value Date    HGB 15.1 03/30/2011     Lab Results   Component Value Date    HCT 43.5 03/30/2011     No components found for: MCT  Lab Results   Component Value Date    MCV 91.5 03/30/2011     Lab Results   Component Value Date    MCH 31.8 03/30/2011     Lab Results   Component Value Date    MCHC 34.7 03/30/2011     Lab Results   Component Value Date    RDW 13.4 03/30/2011     No results found for: PLT   No results found for: A1C   TSH   Date Value Ref Range Status   01/26/2012 2.79 0.4 - 5.0 mU/L Final      No results found for: VITDT   No results for input(s): MAG in the last 36557 hours.  Last Comprehensive Metabolic Panel:  Potassium   Date Value Ref Range Status   06/09/2004  4.1 3.5 - 5.1 mmol/L      Comment:     [facility]     Glucose   Date Value Ref Range Status   02/17/2011 71 60 - 99 mg/dL Final     Creatinine   Date Value Ref Range Status   06/09/2004 0.8 0.6 - 1.3 mg/dL      Comment:     [facility]     ALT   Date Value Ref Range Status   06/09/2004 71 (A) 0 - 65 U/L      Comment:     [facility]     AST   Date Value Ref Range Status   06/09/2004 35 0 - 37 U/L      Comment:     [facility]     Most Recent D-dimer:No lab results found.    Lab on 05/23/2022   Component Date Value Ref Range Status     Vitamin B12 05/23/2022 607  193 - 986 pg/mL Final     Methylmalonic Acid 05/23/2022 0.15  0.00 - 0.40 umol/L Final    INTERPRETIVE INFORMATION:    Slight elevation 0.41-0.99 umol/L is consistent with mild vitamin B12 deficiency, renal insufficiency, or intravascular volume contraction.    Moderate elevation 1.00-9.99 umol/L is consistent with mild vitamin B12 deficiency.    Massive elevation 10.00 umol/L or greater is consistent with significant vitamin B12 deficiency or with inborn errors of metabolism.     Vitamin B6 05/23/2022 124.2  20.0 - 125.0 nmol/L Final    INTERPRETIVE INFORMATION: Vitamin B6 (Pyridoxal 5-Phosphate)    Pyridoxal 5'-phosphate measured in a specimen collected   following an 8-hour or overnight fast accurately indicates   vitamin B6 nutritional status. Non-fasting specimen   concentration reflects recent vitamin intake.    This test was developed and its performance characteristics   determined by [facility]. It has not been cleared or   approved by the US Food and Drug Administration. This test   was performed in a CLIA certified laboratory and is   intended for clinical purposes.  Performed By: [facility]  [address] [place] [id number]  : Sita Garcia MD       Video-Visit Details    Video Visit start Time: 1:30PM    Type of service:  Video  Visit    Video End Time:2:00 PM    Originating Location (pt. Location): Home    Distant Location (provider location):  [facility] [place]     Platform used for Video Visit: Kodi

## 2022-09-13 NOTE — PATIENT INSTRUCTIONS
Post COVID Self Care Suggestions:     Fatigue Management:       https://www.archives-pmr.org/action/showPdf?drg=-9224%2819%7085946-0       Self Care:      [email or web address]  Recovery World Health Organization:    https://apps.who.int/iris/Chu Shutream/handle/88725/998911/YID-INSM-1003-863-93014-27313-eng.pdf  Breathing exercises:    [email or web address]/health/conditions-[email or web address]-[email or web address]/[email or web address]/coronavirus-recovery-breathing-exercises        Take Gabapentin at least 2 hours prior to your magnesium  If this doesn't work we can try increasing - contact me prior to increasing dose  May try Cymbalta for pain if Gabapentin does not work    Talk to PCP about sleep medications.      Assessment of sense of smell and taste    Smell training:  Flowery - Evangelina  Fruity - Lemon  Spicy - Cloves  Resinous - Eucalyptus      1 - Pour a few droplets of one of the oils on to a cotton pad or ball  2 - Do not try to sniff the pad immediately; leave it for a few minutes for the fragrance to develop  3 - Hold the first stick/pad up to your nose, about an inch away.  The order in which you test the oils does not matter  4 - Relax and try to inhale naturally through the nose - sniffing too quickly and deeply is likely to result in you not being able to detect anything  5 - Try this a couple more times, then rest for five minutes  6 - Move on to the next oil and repeat as above.    Repeat this training daily for 6 months.    Patient resources:

## 2022-09-14 ENCOUNTER — OFFICE VISIT (OUTPATIENT)
Dept: NEUROLOGY | Facility: CLINIC | Age: 42
End: 2022-09-14
Payer: COMMERCIAL

## 2022-09-14 ENCOUNTER — DOCUMENTATION ONLY (OUTPATIENT)
Dept: NEUROLOGY | Facility: CLINIC | Age: 42
End: 2022-09-14

## 2022-09-14 DIAGNOSIS — U09.9 POST-COVID CHRONIC NEUROLOGIC SYMPTOMS: ICD-10-CM

## 2022-09-14 DIAGNOSIS — R29.90 POST-COVID CHRONIC NEUROLOGIC SYMPTOMS: ICD-10-CM

## 2022-09-14 PROCEDURE — 88356 ANALYSIS NERVE: CPT | Performed by: PSYCHIATRY & NEUROLOGY

## 2022-09-14 PROCEDURE — 88348 ELECTRON MICROSCOPY DX: CPT | Performed by: PSYCHIATRY & NEUROLOGY

## 2022-09-14 PROCEDURE — 11104 PUNCH BX SKIN SINGLE LESION: CPT | Mod: RT | Performed by: PSYCHIATRY & NEUROLOGY

## 2022-09-14 NOTE — LETTER
9/14/2022       RE: Cherie Emanuel  [id number]  [place] [id number]     Dear Colleague,    Thank you for referring your patient, Cherie Emanuel, to the [facility] [place] [facility]. Please see a copy of my visit note below.    Procedure Note: Neurodiagnostic skin biopsy   ?Reason for biopsy: Generalized pain    Written informed consent was obtained.   ?   The standard sites on the right medial calf were sterilely prepped. Xylocaine 1% was administered by subdermal injection. A total of 3 mL was used. A 3 mm punch biopsy was removed from the calf. The specimen was placed in fixative. The specimen from the calf was placed in a vial labeled as right calf. The presence of the specimen in the vial was verified by two individuals. After verification the sample was sent to the neuropathology laboratory for analysis. The biopsy site was dressed with a pressure bandage. Estimated blood loss was less than 5 drops total. The patient was informed about post-procedure bandage and biopsy site care. She was advised that it might take up to 4 weeks for results of the test to be available. No complications.       Again, thank you for allowing me to participate in the care of your patient.      Sincerely,    Mikhail Reina MD

## 2022-09-14 NOTE — PROGRESS NOTES
After two-point verification, one skin sample; R calf, was sent via tube system to core lab at [facility] to be couriered to [facility]. [facility] was notified via email to expect these samples and confirm receipt. Paperwork was enclosed.

## 2022-09-14 NOTE — PROGRESS NOTES
Procedure Note: Neurodiagnostic skin biopsy   ?Reason for biopsy: Generalized pain    Written informed consent was obtained.   ?   The standard sites on the right medial calf were sterilely prepped. Xylocaine 1% was administered by subdermal injection. A total of 3 mL was used. A 3 mm punch biopsy was removed from the calf. The specimen was placed in fixative. The specimen from the calf was placed in a vial labeled as right calf. The presence of the specimen in the vial was verified by two individuals. After verification the sample was sent to the neuropathology laboratory for analysis. The biopsy site was dressed with a pressure bandage. Estimated blood loss was less than 5 drops total. The patient was informed about post-procedure bandage and biopsy site care. She was advised that it might take up to 4 weeks for results of the test to be available. No complications.

## 2022-09-14 NOTE — LETTER
Date:September 14, 2022      Provider requested that no letter be sent. Do not send.       [facility]

## 2022-12-12 ENCOUNTER — TELEPHONE (OUTPATIENT)
Dept: PHYSICAL MEDICINE AND REHAB | Facility: CLINIC | Age: 42
End: 2022-12-12

## 2022-12-12 NOTE — TELEPHONE ENCOUNTER
Left message for patient to complete questionnaires online via [email or web address] prior to appointment with Dr. Friedman on 12/13/22. Patient informed to call back with any further questions or concerns. Clinic number given ([phone]).     - If questionnaires are not completed on [employer] prior to appointment, patient notified that Virtual Visit Facilitator will be contacting them before appointment to complete forms over the phone.    - If patient is not [email or web address] active, VF can assist with setting up account. If patient declines set up of [email or web address], patient can do forms over the phone with VF.    ___________________________________  How to access questionnaires via [employer]:    Menu---> Records ---> Questionnaires    __________________________________        IVELISSE Drew 9:27 AM December 12, 2022

## 2022-12-26 ENCOUNTER — TELEPHONE (OUTPATIENT)
Dept: PHYSICAL MEDICINE AND REHAB | Facility: CLINIC | Age: 42
End: 2022-12-26

## 2022-12-26 ASSESSMENT — ANXIETY QUESTIONNAIRES
7. FEELING AFRAID AS IF SOMETHING AWFUL MIGHT HAPPEN: NOT AT ALL
GAD7 TOTAL SCORE: 4
GAD7 TOTAL SCORE: 4
8. IF YOU CHECKED OFF ANY PROBLEMS, HOW DIFFICULT HAVE THESE MADE IT FOR YOU TO DO YOUR WORK, TAKE CARE OF THINGS AT HOME, OR GET ALONG WITH OTHER PEOPLE?: SOMEWHAT DIFFICULT
6. BECOMING EASILY ANNOYED OR IRRITABLE: SEVERAL DAYS
1. FEELING NERVOUS, ANXIOUS, OR ON EDGE: SEVERAL DAYS
IF YOU CHECKED OFF ANY PROBLEMS ON THIS QUESTIONNAIRE, HOW DIFFICULT HAVE THESE PROBLEMS MADE IT FOR YOU TO DO YOUR WORK, TAKE CARE OF THINGS AT HOME, OR GET ALONG WITH OTHER PEOPLE: SOMEWHAT DIFFICULT
GAD7 TOTAL SCORE: 4
3. WORRYING TOO MUCH ABOUT DIFFERENT THINGS: SEVERAL DAYS
2. NOT BEING ABLE TO STOP OR CONTROL WORRYING: SEVERAL DAYS
4. TROUBLE RELAXING: NOT AT ALL
7. FEELING AFRAID AS IF SOMETHING AWFUL MIGHT HAPPEN: NOT AT ALL
5. BEING SO RESTLESS THAT IT IS HARD TO SIT STILL: NOT AT ALL

## 2022-12-26 ASSESSMENT — ENCOUNTER SYMPTOMS
MYALGIAS: 1
HEADACHES: 0
ARTHRALGIAS: 0
DIARRHEA: 0
NERVOUS/ANXIOUS: 1
DEPRESSION: 1
PANIC: 0
RECTAL PAIN: 0
BACK PAIN: 0
CONSTIPATION: 1
BLOATING: 1
MUSCLE CRAMPS: 0
STIFFNESS: 0
TINGLING: 1
WEAKNESS: 0
DIZZINESS: 0
MEMORY LOSS: 0
NUMBNESS: 0
NECK PAIN: 0
PARALYSIS: 0
DECREASED CONCENTRATION: 0
BLOOD IN STOOL: 0
VOMITING: 0
ABDOMINAL PAIN: 0
DISTURBANCES IN COORDINATION: 0
MUSCLE WEAKNESS: 0
SEIZURES: 0
JOINT SWELLING: 0
LOSS OF CONSCIOUSNESS: 0
INSOMNIA: 1
HEARTBURN: 0
JAUNDICE: 0
TREMORS: 0
NAUSEA: 0
SPEECH CHANGE: 0
BOWEL INCONTINENCE: 0

## 2022-12-26 NOTE — TELEPHONE ENCOUNTER
Left message for patient to complete questionnaires online via [employer] prior to appointment with Dr. Friedman on 12/27/22. Patient informed to call back with any further questions or concerns. Clinic number given ([phone]).     - If questionnaires are not completed on [employer] prior to appointment, patient notified that Virtual Visit Facilitator will be contacting them before appointment to complete forms over the phone.    - If patient is not [employer] active, VF can assist with setting up account. If patient declines set up of [employer], patient can do forms over the phone with VF.    ___________________________________  How to access questionnaires via [employer]:    Menu---> Records ---> Questionnaires    __________________________________        IVELISSE Drew 8:56 AM December 26, 2022

## 2022-12-27 ENCOUNTER — VIRTUAL VISIT (OUTPATIENT)
Dept: PHYSICAL MEDICINE AND REHAB | Facility: CLINIC | Age: 42
End: 2022-12-27
Payer: COMMERCIAL

## 2022-12-27 ENCOUNTER — TELEPHONE (OUTPATIENT)
Dept: PHYSICAL MEDICINE AND REHAB | Facility: CLINIC | Age: 42
End: 2022-12-27

## 2022-12-27 DIAGNOSIS — M79.10 MYALGIA: Primary | ICD-10-CM

## 2022-12-27 DIAGNOSIS — G47.9 SLEEP DIFFICULTIES: ICD-10-CM

## 2022-12-27 DIAGNOSIS — R29.90 POST-COVID CHRONIC NEUROLOGIC SYMPTOMS: ICD-10-CM

## 2022-12-27 DIAGNOSIS — Z74.09 POST-COVID CHRONIC DECREASED MOBILITY AND ENDURANCE: ICD-10-CM

## 2022-12-27 DIAGNOSIS — R68.89 TEMPERATURE INTOLERANCE: ICD-10-CM

## 2022-12-27 DIAGNOSIS — U09.9 LONG COVID: ICD-10-CM

## 2022-12-27 DIAGNOSIS — U09.9 POST-COVID CHRONIC NEUROLOGIC SYMPTOMS: ICD-10-CM

## 2022-12-27 DIAGNOSIS — R43.0 POST-COVID CHRONIC LOSS OF SMELL: ICD-10-CM

## 2022-12-27 DIAGNOSIS — U09.9 POST-COVID CHRONIC DECREASED MOBILITY AND ENDURANCE: ICD-10-CM

## 2022-12-27 DIAGNOSIS — G89.29 POST-COVID CHRONIC MUSCLE PAIN: ICD-10-CM

## 2022-12-27 DIAGNOSIS — M79.10 POST-COVID CHRONIC MUSCLE PAIN: ICD-10-CM

## 2022-12-27 DIAGNOSIS — R43.2 POST-COVID CHRONIC LOSS OF TASTE: ICD-10-CM

## 2022-12-27 DIAGNOSIS — U09.9 POST-COVID CHRONIC LOSS OF SMELL: ICD-10-CM

## 2022-12-27 DIAGNOSIS — U09.9 POST-COVID CHRONIC MUSCLE PAIN: ICD-10-CM

## 2022-12-27 DIAGNOSIS — U09.9 POST-COVID CHRONIC LOSS OF TASTE: ICD-10-CM

## 2022-12-27 PROCEDURE — 99215 OFFICE O/P EST HI 40 MIN: CPT | Mod: 95 | Performed by: PHYSICAL MEDICINE & REHABILITATION

## 2022-12-27 NOTE — PROGRESS NOTES
Cherie is a 42 year old who is being evaluated via a billable video visit.      How would you like to obtain your AVS? MyChart  If the video visit is dropped, the invitation should be resent by: Text to cell phone: [phone]  Will anyone else be joining your video visit? No      Video-Visit Details    Type of service:  Video Visit   Video Start Time: 10:25 AM  Video End Time:11:35 AM    Originating Location (pt. Location): Home  Distant Location (provider location):  Off-site  Platform [facility] for Video Visit: [facility]     History of COVID-19 infection: Yes  Date of first symptoms: 2/3/22  Diagnosis: antigen  Hospitalization: No  Treatment: Conservative, over the counter  Current Symptoms:  See subjective  Goals of Care: decrease pain and improve quality of life    Current concerns: No     Answers for HPI/ROS submitted by the patient on 12/26/2022  If you checked off any problems, how difficult have these problems made it for you to do your work, take care of things at home, or get along with other people?: Somewhat difficult  PHQ9 TOTAL SCORE: 3  SANDRA 7 TOTAL SCORE: 4  General Symptoms: No  Skin Symptoms: No  HENT Symptoms: No  EYE SYMPTOMS: No  HEART SYMPTOMS: No  LUNG SYMPTOMS: No  INTESTINAL SYMPTOMS: Yes  URINARY SYMPTOMS: No  GYNECOLOGIC SYMPTOMS: No  BREAST SYMPTOMS: Yes  SKELETAL SYMPTOMS: Yes  BLOOD SYMPTOMS: No  NERVOUS SYSTEM SYMPTOMS: Yes  MENTAL HEALTH SYMPTOMS: Yes  Heart burn or indigestion: No  Nausea: No  Vomiting: No  Abdominal pain: No  Bloating: Yes  Constipation: Yes  Diarrhea: No  Blood in stool: No  Black stools: No  Rectal or Anal pain: No  Fecal incontinence: No  Yellowing of skin or eyes: No  Vomit with blood: No  Change in stools: No  Back pain: No  Muscle aches: Yes  Neck pain: No  Swollen joints: No  Joint pain: No  Bone pain: No  Muscle cramps: No  Muscle weakness: No  Joint stiffness: No  Bone fracture: No  Trouble with coordination: No  Dizziness or trouble with balance: No  Fainting or  black-out spells: No  Memory loss: No  Headache: No  Seizures: No  Speech problems: No  Tingling: Yes  Tremor: No  Weakness: No  Difficulty walking: No  Paralysis: No  Numbness: No  Nervous or Anxious: Yes  Depression: Yes  Trouble sleeping: Yes  Trouble thinking or concentrating: No  Mood changes: Yes  Panic attacks: No        PHQ Assesment Total Score(s) 9/12/2022   PHQ-2 Score 1   PHQ-9 Score 3   Some recent data might be hidden      SANDAR-7 Results 9/12/2022   SANDRA 7 TOTAL SCORE 4 (minimal anxiety)   Some recent data might be hidden      PTSD Screen Score 9/12/2022   Have you ever experienced this kind of event? No   Some recent data might be hidden       PHQ Assesment Total Score(s) 12/26/2022   PHQ-9 Score 3   Some recent data might be hidden     SANDRA-7 Results 12/26/2022   SANDRA 7 TOTAL SCORE 4 (minimal anxiety)   Some recent data might be hidden     PTSD Screen Score 12/26/2022   Have you ever experienced this kind of event? No   Some recent data might be hidden     Scoring Physical Function (higher score better) (range: 4 - 20) 17 (   Sum of 4 Physical Function Questions)   Scoring Anxiety (lower score better) (range: 4 - 20) 6 (   Sum of 4 Anxiety Questions)   Scoring Depression (lower score better) (range: 4 - 20) 8 (   Sum of 4 Depression Questions)   Scoring Fatigue (lower score better) (range: 4 - 20) 15 (   Sum of 4 Fatigue [facility] )   Scoring Sleep Disturbance (lower score better) (range: 4 - 20) 12 (   Sum of 4 Sleep Disturbance Questions)   Scoring Satisfaction with Social Role (higher score better) (range: 4 - 20) 13 (   Sum of 4 Satisfaction with Social Role Questions)   Scoring Pain Interference (lower score better) (range: 4 - 20) 13 (   Sum of 4 Pain Interference Questions)         Past Medical History:    Past Medical History:   Diagnosis Date     Depression 2010    took fluoxetine for 1 year. Stopped due to conception concerns. stable off the         Surgical History:   Past Surgical History:  "  Procedure Laterality Date     MOUTH SURGERY      wisdom teeth        Medications:    Current Outpatient Medications   Medication     fluvoxaMINE (LUVOX) 50 MG tablet     magnesium carbonate (MAGONATE) 200 mg/ml liquid     NO ACTIVE MEDICATIONS     No current facility-administered medications for this visit.        Allergies:  No Known Allergies     Family history:   Family History   Problem Relation Age of Onset     Diabetes Maternal Grandfather         overweight     Heart Failure Maternal Grandfather      Cancer Paternal Grandmother         ? breast cancer     C.A.D. Paternal Grandfather          of MI in 70's        Social History:   Social History     Tobacco Use     Smoking status: Never     Smokeless tobacco: Never   Vaping Use     Vaping Use: Never used   Substance Use Topics     Alcohol use: Yes     Comment: occ, maybe glass of wine per week     Drug use: Never          Review of Symptoms:  Constitutional, HEENT, cardiovascular, pulmonary, GI, , musculoskeletal, neuro, skin, endocrine and psych systems are negative, except as otherwise noted.      SUBJECTIVE:     This 42 year old female presents to the [facility] Post-COVID clinic as a new visit to me to evaluate continuing symptoms after COVID infection initially diagnosed 2/3/22.  She has been seeing RICHY Herbert.  Initial visit on 22 reported the following:    \"This 41 year old female presents to the [facility] Post-COVID clinic as a new consult to evaluate continuing symptoms after COVID infection initially diagnosed February 3, 2022 . Briefly,  Cherie CONG Lopezon presented to primary care on 2020 complaining of loss of taste and smell and having blurred vision.  Did got to a drive up and had a negative test unfortunately.  A month later she developed pain with sitting or laying down.  She states the pain was as if she was laying on a hard surface for " "a long time.  She did state the pain was sometimes burning and is disrupting her sleep.  Patient in January 2021 did also develop numbness and tingling in hands and feet which resoled after 6 months.  Patient got a COVID vaccine in May of 2021 and her pain intensified for 6 weeks. Patient did contract COVID in February of 2022. She at this time complained sore throat, congestion, fever, cough. She did not see care for her symptoms and treated with over the counter medications. These symptoms resolve but her myalgia returned again and was worse for 1-2 months and have improved minimally.   Continuing symptoms include myalgia, and nipple sensitivity and burning pain. Patient has tried PT for her myalgias and also seen a number of specialist through [facility]. Past medical history is significant for sleep disturbance. The patient was vaccinated against COVID X2.  Previous activity was part time work.\"    For review in Fall of 2020 she had loss of taste and smell since recurring with feeling continued aching in her gluteus muscles when trying to sleep.  The aches and pains continue throughout her body based on pressure on her body.  She gets tingling with nipple sensitivity.  She has seen Rheumatology and work up was negative.  She has been seeing Neurology who has been following her.  Biopsy was negative along with full work up.  She has been having sleep issues most of her life but this more recent symptoms have made it worse.  She never had the muscle pain before COVID.  She feels she can't exercise vigorously it disturbs her sleep more (this is pre COVID also) though she used to exercise regularly. In the last three months her symptoms are about the same.  They wax and wane.  She feels her fatigue is overall stable.  There is some heat and cold sensitively which she never had before COVID.  Her thinking is doing fine.  Past medical history is significant for PMS and depression.  She admits she feels trapped by " the pain.   The patient was vaccinated against COVID X 2.  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 Cherie Emanuel feels she is functioning at 70 % of pre COVID level.  She has stopped gabapentin as she felt it was ineffective.    OBJECTIVE:     Vitals:    No vitals were obtained today due to virtual visit. Patient states no recent fevers.    Physical Exam   GENERAL: Healthy, alert and no distress  EYES: Eyes grossly normal to inspection.  No discharge or erythema, or obvious scleral/conjunctival abnormalities.  RESP: No audible wheeze, cough, or visible cyanosis.  No visible retractions or increased work of breathing.    SKIN: Visible skin clear. No significant rash, abnormal pigmentation or lesions.  NEURO: Cranial nerves grossly intact.  Mentation and speech appropriate for age.  PSYCH: Mentation appears normal, affect normal/bright, judgement and insight intact, normal speech and appearance well-groomed.      Labs:      I personally reviewed the following lab results today and those on care everywhere, if indicated     SARS-CoV-2 Nucleocapsid Total Ab, S (no units)   Date Value   04/26/2022 Positive (A)          CRP Inflammation   Date Value Ref Range Status   04/26/2022 <2.9 0.0 - 8.0 mg/L Final      Erythrocyte Sedimentation Rate   Date Value Ref Range Status   04/26/2022 4 0 - 20 mm/hr Final      No results found for: WBC  No results found for: RBC  Lab Results   Component Value Date    HGB 15.1 03/30/2011     Lab Results   Component Value Date    HCT 43.5 03/30/2011     No components found for: MCT  Lab Results   Component Value Date    MCV 91.5 03/30/2011     Lab Results   Component Value Date    MCH 31.8 03/30/2011     Lab Results   Component Value Date    MCHC 34.7 03/30/2011     Lab Results   Component Value Date    RDW 13.4 03/30/2011     No results found for: PLT   No results found for: A1C   TSH   Date Value Ref Range Status   01/26/2012 2.79 0.4 - 5.0 mU/L Final      No results found  for: VITDT   No results for input(s): MAG in the last 88999 hours.  Last Comprehensive Metabolic Panel:  Potassium   Date Value Ref Range Status   06/09/2004 4.1 3.5 - 5.1 mmol/L      Comment:     [facility]     Glucose   Date Value Ref Range Status   02/17/2011 71 60 - 99 mg/dL Final     Creatinine   Date Value Ref Range Status   06/09/2004 0.8 0.6 - 1.3 mg/dL      Comment:     [facility]     ALT   Date Value Ref Range Status   06/09/2004 71 (A) 0 - 65 U/L      Comment:     [facility]     AST   Date Value Ref Range Status   06/09/2004 35 0 - 37 U/L      Comment:     [facility]     No lab results found.         Ref Range & Units 2 mo ago    Anti-Smith (COLBY) Antibody, IgG <20 IU <20    Anti-Smith (COLBY) Antibody Interpretation Negative Negative    Martinez/RNP (COLBY) Antibody, IgG <20 IU <20    Martinez/RNP (COLBY) Antibody Interpretation Negative Negative    Anti-SSA (Anti-Ro) <20 IU <20    Anti-SSA (Anti-Ro) Interpretation Negative Negative    Anti-SSB (Anti- La) <20 IU <20    Anti-SSB (Anti- La) Interpretation Negative Negative    Resulting Agency  [facility]   Narrative  Performed by [facility]  The results from Anti-SSA, Anti-SSB, Anti-Smith and Martinez/RNP were obtained with the [employer]ite. Values that are obtained with a different manufacturers' assay method may not be used interchangeably.  Specimen Collected: 09/30/22  1:35 PM Last Resulted: 10/03/22  3:36 PM   Received From: [facility]  Result Received: 12/27/22 10:19 AM      Ref Range & Units 2 mo ago   Total Protein 6.4 - 8.3 g/dL 7.3    Albumin 3.4 - 4.8 g/dL 4.8    Alpha 1 0.2 - 0.5 g/dL 0.3    Alpha 2 0.5 - 1.1 g/dL 0.6    Beta 0.6 - 1.1 g/dL 0.7    Gamma 0.7 - 1.6 g/dL 0.9    Monoclonal Dimitris <=0.0 g/dL 0.0    Interpretation  No monoclonal protein is detected in the serum.    Signed Out By  [facility]    Resulting Agency  [facility]   Specimen Collected: 09/30/22  1:35 PM Last Resulted: 10/04/22  3:49 PM   Received From: [facility]  Result Received: 11/17/22  9:17 AM     Office Visit on 04/26/2022  Component  Date  Value  Ref Range  Status     KETTY interpretation  04/26/2022  Negative   Negative  Final        Negative:              <1:40  Borderline Positive:   1:40 - 1:80  Positive:              >1:80     CRP Inflammation  04/26/2022  <2.9   0.0 - 8.0 mg/L  Final     Cyclic Citrullinated Peptide Antib*  04/26/2022  1.6   <7.0 U/mL  Final     Negative     Erythrocyte Sedimentation Rate  04/26/2022  4   0 - 20 mm/hr  Final     Rheumatoid Factor  04/26/2022  6   <12 IU/mL  Final     Hepatitis C Antibody  04/26/2022  Nonreactive   Nonreactive  Final     SARS-CoV-2 Nucleocapsid Total Ab, S  04/26/2022  Positive (A)  Negative  Final     SARS-CoV-2 antibodies detected. Results suggest recent   or prior SARS-CoV-2 infection. Correlation with   epidemiologic risk factors and other clinical and   laboratory findings is recommended. Serologic results   should not be used to diagnose recent SARS-CoV-2 infection.   Protective immunity cannot be inferred based on these   results alone. False positive results may occur due to   cross reactivity from pre-existing antibodies or   other possible causes.        Reviewed labs from [facility], [facility] and [facility]     Imaging    I personally reviewed the following imaging results today and those on care everywhere, if indicated     Reviewed imaging from [facility], [facility] and [facility]     Medical Records Reviewed:    Reviewed consults/documents from [facility], [facility] and [facility] including Neurology and PT.  PT was for incontinence.    Impression:  1. Myalgias  2. Sleep disturbance  3. Temperature intolerance  4. Tingling and nerve sensitivity  5. Intermittent dysgeusia and hyposmia  6. Post acute sequelae of COVID (Long COVID  syndrome)  7. H/o depression      Plan:  1. I reviewed present knowledge on long-Covid.  Education was provided and questions were answered.  2. PT post COVID program for exercise, pacing and education.  3. With unusual myalgias, increased sleep disturbance and temperature intolerance will check AM cortisol level.  If abnormal will send to Endocrinology.  4. With continuing myalgias and nerve complaints of unclear etiology will refer to Dr. Veliz (PM&R), to be seen in clinic.  5. Recommend she get the booster COVID vaccine.   6. I will follow up with Cherie Emanuel in 6 months. I will review progress and consider need for any other therapeutic interventions (Endocrinology, Acupuncture, Health Psychology, OT etc). If there are any questions and/or concerns she will call the clinic.      On day of encounter time spent in chart review and with patient in consultation, exam, education and coordination of care, excluding procedures:  62 minutes     Linda Friedman MD, FAAPMR   Department Rehabilitation Medicine-Wound Fellowship Director  Adjunct  Department Family Medicine and Community Health  [facility]

## 2022-12-27 NOTE — LETTER
12/27/2022       RE: Cherie Emanuel  [id number]  [facility] [id number]     Dear Colleague,    Thank you for referring your patient, Cherie Emanuel, to the [facility] [place] at [facility]. Please see a copy of my visit note below.    History of COVID-19 infection: Yes  Date of first symptoms: 2/3/22  Diagnosis: antigen  Hospitalization: No  Treatment: Conservative, over the counter  Current Symptoms:  See subjective  Goals of Care: decrease pain and improve quality of life    Current concerns: No     Answers for HPI/ROS submitted by the patient on 12/26/2022  If you checked off any problems, how difficult have these problems made it for you to do your work, take care of things at home, or get along with other people?: Somewhat difficult  PHQ9 TOTAL SCORE: 3  SANDRA 7 TOTAL SCORE: 4  General Symptoms: No  Skin Symptoms: No  HENT Symptoms: No  EYE SYMPTOMS: No  HEART SYMPTOMS: No  LUNG SYMPTOMS: No  INTESTINAL SYMPTOMS: Yes  URINARY SYMPTOMS: No  GYNECOLOGIC SYMPTOMS: No  BREAST SYMPTOMS: Yes  SKELETAL SYMPTOMS: Yes  BLOOD SYMPTOMS: No  NERVOUS SYSTEM SYMPTOMS: Yes  MENTAL HEALTH SYMPTOMS: Yes  Heart burn or indigestion: No  Nausea: No  Vomiting: No  Abdominal pain: No  Bloating: Yes  Constipation: Yes  Diarrhea: No  Blood in stool: No  Black stools: No  Rectal or Anal pain: No  Fecal incontinence: No  Yellowing of skin or eyes: No  Vomit with blood: No  Change in stools: No  Back pain: No  Muscle aches: Yes  Neck pain: No  Swollen joints: No  Joint pain: No  Bone pain: No  Muscle cramps: No  Muscle weakness: No  Joint stiffness: No  Bone fracture: No  Trouble with coordination: No  Dizziness or trouble with balance: No  Fainting or black-out spells: No  Memory loss: No  Headache: No  Seizures: No  Speech problems: No  Tingling: Yes  Tremor: No  Weakness: No  Difficulty walking: No  Paralysis: No  Numbness:  No  Nervous or Anxious: Yes  Depression: Yes  Trouble sleeping: Yes  Trouble thinking or concentrating: No  Mood changes: Yes  Panic attacks: No        PHQ Assesment Total Score(s) 9/12/2022   PHQ-2 Score 1   PHQ-9 Score 3   Some recent data might be hidden      SANDRA-7 Results 9/12/2022   SANDRA 7 TOTAL SCORE 4 (minimal anxiety)   Some recent data might be hidden      PTSD Screen Score 9/12/2022   Have you ever experienced this kind of event? No   Some recent data might be hidden       PHQ Assesment Total Score(s) 12/26/2022   PHQ-9 Score 3   Some recent data might be hidden     SANDRA-7 Results 12/26/2022   SANDRA 7 TOTAL SCORE 4 (minimal anxiety)   Some recent data might be hidden     PTSD Screen Score 12/26/2022   Have you ever experienced this kind of event? No   Some recent data might be hidden     Scoring Physical Function (higher score better) (range: 4 - 20) 17 (   Sum of 4 Physical Function Questions)   Scoring Anxiety (lower score better) (range: 4 - 20) 6 (   Sum of 4 Anxiety Questions)   Scoring Depression (lower score better) (range: 4 - 20) 8 (   Sum of 4 Depression Questions)   Scoring Fatigue (lower score better) (range: 4 - 20) 15 (   Sum of 4 Fatigue [facility] )   Scoring Sleep Disturbance (lower score better) (range: 4 - 20) 12 (   Sum of 4 Sleep Disturbance Questions)   Scoring Satisfaction with Social Role (higher score better) (range: 4 - 20) 13 (   Sum of 4 Satisfaction with Social Role Questions)   Scoring Pain Interference (lower score better) (range: 4 - 20) 13 (   Sum of 4 Pain Interference Questions)         Past Medical History:    Past Medical History:   Diagnosis Date     Depression 2010    took fluoxetine for 1 year. Stopped due to conception concerns. stable off the         Surgical History:   Past Surgical History:   Procedure Laterality Date     MOUTH SURGERY      wisdom teeth        Medications:    Current Outpatient Medications   Medication     fluvoxaMINE (LUVOX) 50 MG tablet     magnesium  "carbonate (MAGONATE) 200 mg/ml liquid     NO ACTIVE MEDICATIONS     No current facility-administered medications for this visit.        Allergies:  No Known Allergies     Family history:   Family History   Problem Relation Age of Onset     Diabetes Maternal Grandfather         overweight     Heart Failure Maternal Grandfather      Cancer Paternal Grandmother         ? breast cancer     C.A.D. Paternal Grandfather          of MI in 70's        Social History:   Social History     Tobacco Use     Smoking status: Never     Smokeless tobacco: Never   Vaping Use     Vaping Use: Never used   Substance Use Topics     Alcohol use: Yes     Comment: occ, maybe glass of wine per week     Drug use: Never          Review of Symptoms:  Constitutional, HEENT, cardiovascular, pulmonary, GI, , musculoskeletal, neuro, skin, endocrine and psych systems are negative, except as otherwise noted.      SUBJECTIVE:     This 42 year old female presents to the [facility] Post-COVID clinic as a new visit to me to evaluate continuing symptoms after COVID infection initially diagnosed 2/3/22.  She has been seeing RICHY Herbert.  Initial visit on 22 reported the following:    \"This 41 year old female presents to the [facility] Post[facility]COVID clinic as a new consult to evaluate continuing symptoms after COVID infection initially diagnosed February 3, 2022 . Briefly,  Cherie Emanuel presented to primary care on 2020 complaining of loss of taste and smell and having blurred vision.  Did got to a drive up and had a negative test unfortunately.  A month later she developed pain with sitting or laying down.  She states the pain was as if she was laying on a hard surface for a long time.  She did state the pain was sometimes burning and is disrupting her sleep.  Patient in 2021 did also develop numbness and tingling in hands and feet which " "resoled after 6 months.  Patient got a COVID vaccine in May of 2021 and her pain intensified for 6 weeks. Patient did contract COVID in February of 2022. She at this time complained sore throat, congestion, fever, cough. She did not see care for her symptoms and treated with over the counter medications. These symptoms resolve but her myalgia returned again and was worse for 1-2 months and have improved minimally.   Continuing symptoms include myalgia, and nipple sensitivity and burning pain. Patient has tried PT for her myalgias and also seen a number of specialist through [facility]. Past medical history is significant for sleep disturbance. The patient was vaccinated against COVID X2.  Previous activity was part time work.\"    For review in Fall of 2020 she had loss of taste and smell since recurring with feeling continued aching in her gluteus muscles when trying to sleep.  The aches and pains continue throughout her body based on pressure on her body.  She gets tingling with nipple sensitivity.  She has seen Rheumatology and work up was negative.  She has been seeing Neurology who has been following her.  Biopsy was negative along with full work up.  She has been having sleep issues most of her life but this more recent symptoms have made it worse.  She never had the muscle pain before COVID.  She feels she can't exercise vigorously it disturbs her sleep more (this is pre COVID also) though she used to exercise regularly. In the last three months her symptoms are about the same.  They wax and wane.  She feels her fatigue is overall stable.  There is some heat and cold sensitively which she never had before COVID.  Her thinking is doing fine.  Past medical history is significant for PMS and depression.  She admits she feels trapped by the pain.   The patient was vaccinated against COVID X 2.  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 Cherie Emanuel feels she is functioning at 70 % of pre COVID " level.  She has stopped gabapentin as she felt it was ineffective.    OBJECTIVE:     Vitals:    No vitals were obtained today due to virtual visit. Patient states no recent fevers.    Physical Exam   GENERAL: Healthy, alert and no distress  EYES: Eyes grossly normal to inspection.  No discharge or erythema, or obvious scleral/conjunctival abnormalities.  RESP: No audible wheeze, cough, or visible cyanosis.  No visible retractions or increased work of breathing.    SKIN: Visible skin clear. No significant rash, abnormal pigmentation or lesions.  NEURO: Cranial nerves grossly intact.  Mentation and speech appropriate for age.  PSYCH: Mentation appears normal, affect normal/bright, judgement and insight intact, normal speech and appearance well-groomed.      Labs:      I personally reviewed the following lab results today and those on care everywhere, if indicated     SARS-CoV-2 Nucleocapsid Total Ab, S (no units)   Date Value   04/26/2022 Positive (A)          CRP Inflammation   Date Value Ref Range Status   04/26/2022 <2.9 0.0 - 8.0 mg/L Final      Erythrocyte Sedimentation Rate   Date Value Ref Range Status   04/26/2022 4 0 - 20 mm/hr Final      No results found for: WBC  No results found for: RBC  Lab Results   Component Value Date    HGB 15.1 03/30/2011     Lab Results   Component Value Date    HCT 43.5 03/30/2011     No components found for: MCT  Lab Results   Component Value Date    MCV 91.5 03/30/2011     Lab Results   Component Value Date    MCH 31.8 03/30/2011     Lab Results   Component Value Date    MCHC 34.7 03/30/2011     Lab Results   Component Value Date    RDW 13.4 03/30/2011     No results found for: PLT   No results found for: A1C   TSH   Date Value Ref Range Status   01/26/2012 2.79 0.4 - 5.0 mU/L Final      No results found for: VITDT   No results for input(s): MAG in the last 69129 hours.  Last Comprehensive Metabolic Panel:  Potassium   Date Value Ref Range Status   06/09/2004 4.1 3.5 - 5.1  mmol/L      Comment:     [facility]     Glucose   Date Value Ref Range Status   02/17/2011 71 60 - 99 mg/dL Final     Creatinine   Date Value Ref Range Status   06/09/2004 0.8 0.6 - 1.3 mg/dL      Comment:     [facility]     ALT   Date Value Ref Range Status   06/09/2004 71 (A) 0 - 65 U/L      Comment:     [facility]     AST   Date Value Ref Range Status   06/09/2004 35 0 - 37 U/L      Comment:     [facility]     No lab results found.         Ref Range & Units 2 mo ago    Anti-Smith (COLBY) Antibody, IgG <20 IU <20    Anti-Smith (COLBY) Antibody Interpretation Negative Negative    Martinez/RNP (COLBY) Antibody, IgG <20 IU <20    Martinez/RNP (COLBY) Antibody Interpretation Negative Negative    Anti-SSA (Anti-Ro) <20 IU <20    Anti-SSA (Anti-Ro) Interpretation Negative Negative    Anti-SSB (Anti- La) <20 IU <20    Anti-SSB (Anti- La) Interpretation Negative Negative    Resulting Agency  [facility]   Narrative  Performed by [facility]  The results from Anti-SSA, Anti-SSB, Anti-Smith and Martinez/RNP were obtained with the [employer] QUANTA Lite. Values that are obtained with a different manufacturers' assay method may not be used interchangeably.  Specimen Collected: 09/30/22  1:35 PM Last Resulted: 10/03/22  3:36 PM   Received From: [facility]  Result Received: 12/27/22 10:19 AM      Ref Range & Units 2 mo ago   Total Protein 6.4 - 8.3 g/dL 7.3    Albumin 3.4 - 4.8 g/dL 4.8    Alpha 1 0.2 - 0.5 g/dL 0.3    Alpha 2 0.5 - 1.1 g/dL 0.6    Beta 0.6 - 1.1 g/dL 0.7    Gamma 0.7 - 1.6 g/dL 0.9    Monoclonal Dimitris <=0.0 g/dL 0.0    Interpretation  No monoclonal protein is detected in the serum.    Signed Out By  [facility]    Resulting Agency  [facility]   Specimen Collected: 09/30/22  1:35 PM Last Resulted: 10/04/22  3:49 PM   Received From: [facility]  Result Received: 11/17/22  9:17 AM     Office Visit on  04/26/2022  Component  Date  Value  Ref Range  Status     KETTY interpretation  04/26/2022  Negative   Negative  Final        Negative:              <1:40  Borderline Positive:   1:40 - 1:80  Positive:              >1:80     CRP Inflammation  04/26/2022  <2.9   0.0 - 8.0 mg/L  Final     Cyclic Citrullinated Peptide Antib*  04/26/2022  1.6   <7.0 U/mL  Final     Negative     Erythrocyte Sedimentation Rate  04/26/2022  4   0 - 20 mm/hr  Final     Rheumatoid Factor  04/26/2022  6   <12 IU/mL  Final     Hepatitis C Antibody  04/26/2022  Nonreactive   Nonreactive  Final     SARS-CoV-2 Nucleocapsid Total Ab, S  04/26/2022  Positive (A)  Negative  Final     SARS-CoV-2 antibodies detected. Results suggest recent   or prior SARS-CoV-2 infection. Correlation with   epidemiologic risk factors and other clinical and   laboratory findings is recommended. Serologic results   should not be used to diagnose recent SARS-CoV-2 infection.   Protective immunity cannot be inferred based on these   results alone. False positive results may occur due to   cross reactivity from pre-existing antibodies or   other possible causes.        Reviewed labs from [facility], [facility] and [facility]     Imaging    I personally reviewed the following imaging results today and those on care everywhere, if indicated     Reviewed imaging from [facility], [facility] and [facility]     Medical Records Reviewed:    Reviewed consults/documents from [facility], [facility] and [facility] including Neurology and PT.  PT was for incontinence.    Impression:  1. Myalgias  2. Sleep disturbance  3. Temperature intolerance  4. Tingling and nerve sensitivity  5. Intermittent dysgeusia and hyposmia  6. Post acute sequelae of COVID (Long COVID syndrome)  7. H/o depression      Plan:  1. I reviewed present knowledge on long-Covid.  Education was provided and questions were answered.  2. PT post COVID program for exercise,  pacing and education.  3. With unusual myalgias, increased sleep disturbance and temperature intolerance will check AM cortisol level.  If abnormal will send to Endocrinology.  4. With continuing myalgias and nerve complaints of unclear etiology will refer to Dr. Veliz (PM&R), to be seen in clinic.  5. Recommend she get the booster COVID vaccine.   6. I will follow up with Cherie Emanuel in 6 months. I will review progress and consider need for any other therapeutic interventions (Endocrinology, Acupuncture, Health Psychology, OT etc). If there are any questions and/or concerns she will call the clinic.      On day of encounter time spent in chart review and with patient in consultation, exam, education and coordination of care, excluding procedures:  62 minutes       Linad Friedman MD, FAAPMR   Department Rehabilitation Medicine-Wound Fellowship Director  Adjunct  Department Family Medicine and Community Health  [employer]

## 2022-12-27 NOTE — PATIENT INSTRUCTIONS
Plan and Recommendations:    PT post COVID program for exercise, pacing and education.  With unusual myalgias, increased sleep disturbance and temperature intolerance will check AM cortisol level.  If abnormal will send to Endocrinology.  With continuing myalgias and nerve complaints of unclear etiology will refer to Dr. Veliz (PM&R), to be seen in clinic.  Recommend she get the booster COVID vaccine.   I will follow up with Cherie Emanuel in 6 months. I will review progress and consider need for any other therapeutic interventions (Endocrinology, Acupuncture, Health Psychology, OT etc). If there are any questions and/or concerns she will call the clinic.

## 2023-06-02 ENCOUNTER — HEALTH MAINTENANCE LETTER (OUTPATIENT)
Age: 43
End: 2023-06-02

## 2023-10-12 ENCOUNTER — VIRTUAL VISIT (OUTPATIENT)
Dept: PHYSICAL MEDICINE AND REHAB | Facility: CLINIC | Age: 43
End: 2023-10-12
Payer: COMMERCIAL

## 2023-10-12 VITALS — WEIGHT: 140 LBS | BODY MASS INDEX: 23.3 KG/M2

## 2023-10-12 DIAGNOSIS — R29.90 POST-COVID CHRONIC NEUROLOGIC SYMPTOMS: ICD-10-CM

## 2023-10-12 DIAGNOSIS — U09.9 POST-COVID CHRONIC NEUROLOGIC SYMPTOMS: ICD-10-CM

## 2023-10-12 DIAGNOSIS — M79.10 MYALGIA: Primary | ICD-10-CM

## 2023-10-12 DIAGNOSIS — R43.2 LOSS OF TASTE: ICD-10-CM

## 2023-10-12 DIAGNOSIS — U09.9 LONG COVID: ICD-10-CM

## 2023-10-12 PROCEDURE — 99215 OFFICE O/P EST HI 40 MIN: CPT | Mod: VID | Performed by: PHYSICIAN ASSISTANT

## 2023-10-12 ASSESSMENT — PATIENT HEALTH QUESTIONNAIRE - PHQ9
SUM OF ALL RESPONSES TO PHQ QUESTIONS 1-9: 6
10. IF YOU CHECKED OFF ANY PROBLEMS, HOW DIFFICULT HAVE THESE PROBLEMS MADE IT FOR YOU TO DO YOUR WORK, TAKE CARE OF THINGS AT HOME, OR GET ALONG WITH OTHER PEOPLE: SOMEWHAT DIFFICULT
SUM OF ALL RESPONSES TO PHQ QUESTIONS 1-9: 6

## 2023-10-12 ASSESSMENT — ANXIETY QUESTIONNAIRES
4. TROUBLE RELAXING: NOT AT ALL
5. BEING SO RESTLESS THAT IT IS HARD TO SIT STILL: NOT AT ALL
IF YOU CHECKED OFF ANY PROBLEMS ON THIS QUESTIONNAIRE, HOW DIFFICULT HAVE THESE PROBLEMS MADE IT FOR YOU TO DO YOUR WORK, TAKE CARE OF THINGS AT HOME, OR GET ALONG WITH OTHER PEOPLE: SOMEWHAT DIFFICULT
6. BECOMING EASILY ANNOYED OR IRRITABLE: SEVERAL DAYS
2. NOT BEING ABLE TO STOP OR CONTROL WORRYING: SEVERAL DAYS
GAD7 TOTAL SCORE: 4
GAD7 TOTAL SCORE: 4
1. FEELING NERVOUS, ANXIOUS, OR ON EDGE: SEVERAL DAYS
3. WORRYING TOO MUCH ABOUT DIFFERENT THINGS: SEVERAL DAYS
7. FEELING AFRAID AS IF SOMETHING AWFUL MIGHT HAPPEN: NOT AT ALL

## 2023-10-12 ASSESSMENT — ENCOUNTER SYMPTOMS
MYALGIAS: 1
BACK PAIN: 1
FATIGUE: 1
PARESTHESIAS: 1

## 2023-10-12 NOTE — NURSING NOTE
Is the patient currently in the state of [place]? YES    Visit mode:VIDEO    If the visit is dropped, the patient can be reconnected by: VIDEO VISIT: Text to cell phone:   Telephone Information:   Mobile [phone]       Will anyone else be joining the visit? NO  (If patient encounters technical issues they should call [phone]    How would you like to obtain your AVS? MyChart    Are changes needed to the allergy or medication list? No, Pt stated no changes to allergies, and Pt stated no med changes    Reason for visit: NICOLE KELLER

## 2023-10-12 NOTE — PATIENT INSTRUCTIONS
Assessment of sense of smell and taste    Smell training:  Flowery - Evangelina  Fruity - Lemon  Spicy - Cloves  Resinous - Eucalyptus      1 - Pour a few droplets of one of the oils on to a cotton pad or ball  2 - Do not try to sniff the pad immediately; leave it for a few minutes for the fragrance to develop  3 - Hold the first stick/pad up to your nose, about an inch away.  The order in which you test the oils does not matter  4 - Relax and try to inhale naturally through the nose - sniffing too quickly and deeply is likely to result in you not being able to detect anything  5 - Try this a couple more times, then rest for five minutes  6 - Move on to the next oil and repeat as above.    Repeat this training daily for 6 months.    Patient resources:

## 2023-10-12 NOTE — PROGRESS NOTES
Cherie Emanuel is a 43 year old female who presents to be evaluated for a billable video visit.    Video-Visit Details    Video visit Start time: 2:07 pm    Type of service:  Video Visit    Video End Time: 2:43 PM    Originating Location (pt. Location): Home    Distant Location (provider location):  Off- Site    Platform used for Video Visit: [employer]    Assessment/Impression   1. Myalgia/Post-COVID chronic neurologic symptoms  Patient with lingering myalgias and paresthesias.  Rheumatology and neurology work-up normal.  Discussed having patient see Dr. Almaguer in physical medicine and rehab.  Also discussed Cymbalta versus acupuncture.  Patient wishes to start with acupuncture. Side effects of Cymbalta discussed if she decides to start.   - Adult Physical Medicine and Rehab  Referral; Future  - Acupuncture Referral; Future    2. Loss of taste  Patient reports intermittent loss of taste that is transient.  Discussed olfactory training and provided information in patient instructions.  As patient had viral infection that triggered loss of taste initially discussed this is part of treatment.    3. Long COVID  Discussed COVID and Post COVID with patient.  Educational materials provided and all questions answered.  Discussed booster vaccination and risk for increasing myalgias, as she had these previously with previous vaccines.  Discussed trial of Cymbalta if she develops pain, patient will reach out if needed.    Plan:  I reviewed present knowledge on long-Covid.  Education was provided and question were answered.  Orders/Referrals as above  I will advised patient on test results  I will follow up with Cherie Emanuel in 3 months. I will review progress and consider need for any other therapeutic interventions. If there are any questions and/or concerns she will call the clinic.      On day of encounter time spent in chart review and with patient in consultation, exam, education,coordination of care,  review of  outside charts/documentation and documentation:  45 minutes     I have attempted to proof read for major spelling errors and apologize for any minor errors I may have missed.     This note was dictated using voice recognition software. Any grammatical or context distortions are unintentional and inherent to the software.  _________________________________  Nidhi Herbert PA-C  [facility] PHYSICAL MEDICINE AND REHABILITATION CLINIC [facility]   Patient returns for follow-up.  Briefly was initially seen May 2022.  At that visit she told me she had developed a viral infection September 11, 2020 where she lost her taste and smell and had blurred vision her COVID test was negative at this time a month later she developed her myalgias which have continued since patient did get a COVID-vaccine in May 2021 and this intensified her pain for 6 weeks she got her second shot which did cause pain but not to the same extent as the first.  She most recently saw Dr. Friedman in December of 2022.  At this visit she had seen rheumatology with a negative work-up. She was working with neurology and a biopsy was negative with full work-up. She was reporting waxing and waning symptoms fatigue with stable and concentration with stable.  Today she reports continues muscle pain and sleeps mostly on her stomach. Sleeping on side or back causes pain.  She had a nerve biopsy in September 2022 which was normal.   Patient has constant aching pain.   Patient developed this symptoms after a loss of taste and smell in 2020.        Current concerns: Health Concerns        10/12/2023    12:45 PM   PHQ Assesment Total Score(s)   PHQ-9 Score 6           10/12/2023    12:46 PM   SANDRA-7 Results   SANDRA 7 TOTAL SCORE 4 (minimal anxiety)   SANDRA-7 Total Score 4         10/12/2023    12:46 PM   PTSD Screen Score   Have you ever experienced this kind of event? No         10/12/2023    12:49 PM   PROMIS-29   PROMIS Physical  Function T-Score 45 (within normal limits)   PROMIS Anxiety T-Score 51 (within normal limits)   PROMIS Depression T-Score 57 (mild)   PROMIS Fatigue T-Score 59 (mild)   PROMIS Sleep Disturbance T-Score 58 (mild)   PROMIS Ability to Participate in Social Roles & Activities T-Score 45 (within normal limits)   PROMIS Pain Interference T-Score 63 (moderate)   PROMIS Pain Intensity 4     Past Medical History:   Diagnosis Date    Depression     took fluoxetine for 1 year. Stopped due to conception concerns. stable off the        Past Surgical History:   Procedure Laterality Date    MOUTH SURGERY      wisdom teeth       Family History   Problem Relation Age of Onset    Diabetes Maternal Grandfather         overweight    Heart Failure Maternal Grandfather     Cancer Paternal Grandmother         ? breast cancer    C.A.D. Paternal Grandfather          of MI in 70's       Social History     Tobacco Use    Smoking status: Never    Smokeless tobacco: Never   Vaping Use    Vaping Use: Never used   Substance Use Topics    Alcohol use: Yes     Comment: occ, maybe glass of wine per week    Drug use: Never         Current Outpatient Medications:     fluvoxaMINE (LUVOX) 50 MG tablet, Take 50 mg by mouth, Disp: , Rfl:     magnesium carbonate (MAGONATE) 200 mg/ml liquid, , Disp: , Rfl:     NO ACTIVE MEDICATIONS, , Disp: , Rfl:     Answers to ROS/HPI submitted by patient on 10/12/2023  Review of Systems   Constitutional:  Positive for fatigue.   Musculoskeletal:  Positive for back pain and myalgias.   Neurological:  Positive for paresthesias.          Objective    Vitals - Patient Reported  Pain Score: Mild Pain (3)          Objective         Vitals:  No vitals were obtained today due to virtual visit.    Physical Exam   GENERAL: Healthy, alert and no distress  EYES: Eyes grossly normal to inspection.  No discharge or erythema, or obvious scleral/conjunctival abnormalities.  RESP: No audible wheeze, cough, or visible cyanosis.   "No visible retractions or increased work of breathing.    SKIN: Visible skin clear. No significant rash, abnormal pigmentation or lesions.  NEURO: Cranial nerves grossly intact.  Mentation and speech appropriate for age.  PSYCH: Mentation appears normal, affect normal/bright, judgement and insight intact, normal speech and appearance well-groomed.        SARS-CoV-2 Nucleocapsid Total Ab, S (no units)   Date Value   04/26/2022 Positive (A)          CRP Inflammation   Date Value Ref Range Status   04/26/2022 <2.9 0.0 - 8.0 mg/L Final      Erythrocyte Sedimentation Rate   Date Value Ref Range Status   04/26/2022 4 0 - 20 mm/hr Final      Last Renal Panel:  Potassium   Date Value Ref Range Status   06/09/2004 4.1 3.5 - 5.1 mmol/L      Comment:     [facility]     Glucose   Date Value Ref Range Status   02/17/2011 71 60 - 99 mg/dL Final     Creatinine   Date Value Ref Range Status   06/09/2004 0.8 0.6 - 1.3 mg/dL      Comment:     [facility]      No results found for: \"WBC\"  No results found for: \"RBC\"  Lab Results   Component Value Date    HGB 15.1 03/30/2011     Lab Results   Component Value Date    HCT 43.5 03/30/2011     No components found for: \"MCT\"  Lab Results   Component Value Date    MCV 91.5 03/30/2011     Lab Results   Component Value Date    MCH 31.8 03/30/2011     Lab Results   Component Value Date    MCHC 34.7 03/30/2011     Lab Results   Component Value Date    RDW 13.4 03/30/2011     No results found for: \"PLT\"   No results found for: \"A1C\"   TSH   Date Value Ref Range Status   01/26/2012 2.79 0.4 - 5.0 mU/L Final      No results found for: \"VITDT\"   No results for input(s): \"MAG\" in the last 75207 hours.  Last Comprehensive Metabolic Panel:  Potassium   Date Value Ref Range Status   06/09/2004 4.1 3.5 - 5.1 mmol/L      Comment:     [facility]     Glucose   Date Value Ref Range Status   02/17/2011 71 60 - 99 mg/dL Final     Creatinine   Date Value Ref " Range Status   06/09/2004 0.8 0.6 - 1.3 mg/dL      Comment:     [facility]     ALT   Date Value Ref Range Status   06/09/2004 71 (A) 0 - 65 U/L      Comment:     [facility]     AST   Date Value Ref Range Status   06/09/2004 35 0 - 37 U/L      Comment:     [facility]         Imaging:    I personally reviewed the following imaging results today and those on care everywhere, if indicated     Nothing new to review    Reviewed imaging from [facility] sites, [place], and [facility]     Medical Records Reviewed:    Reviewed consults/documents from [facility], [place], and [facility] including  PM&R, Rheumatology,  and Neurology

## 2024-01-09 ENCOUNTER — TELEPHONE (OUTPATIENT)
Dept: PHYSICAL MEDICINE AND REHAB | Facility: CLINIC | Age: 44
End: 2024-01-09
Payer: COMMERCIAL

## 2024-01-10 NOTE — TELEPHONE ENCOUNTER
[facility] Call Center    Phone Message    May a detailed message be left on voicemail: yes     Reason for Call: Appointment Intake    Referring Provider Name: Nidhi Lynch   Diagnosis and/or Symptoms: Myalgia  Patient asked about PMR referral. Myalgia is not on list of diagnosis on protocols.Writer unsure of what visit type to schedule patient for. Please review and call back to schedule   Action Taken: Message routed to:  [facility] PMR    Travel Screening: Not Applicable                                                                   
Reached out to pt. Had to LVM asked if she could call back and proceed with scheduling a new pt appt with Dr Veliz regarding the referral received from Nidhi CALLE.  
Detail Level: Zone

## 2024-02-02 ENCOUNTER — TELEPHONE (OUTPATIENT)
Dept: PHYSICAL MEDICINE AND REHAB | Facility: CLINIC | Age: 44
End: 2024-02-02
Payer: COMMERCIAL

## 2024-02-02 NOTE — TELEPHONE ENCOUNTER
LVM for pt to change appt time on 3/11/24 with Dr. Herbert, pt can schedule in any open slot other than [facility]ONALD or 11:00.    2/2/24 BD

## 2024-02-05 ENCOUNTER — TELEPHONE (OUTPATIENT)
Dept: PHYSICAL MEDICINE AND REHAB | Facility: CLINIC | Age: 44
End: 2024-02-05
Payer: COMMERCIAL

## 2024-02-05 NOTE — TELEPHONE ENCOUNTER
LVM, sent MyC for pt to resched time for appt on 3/11/24 with Dr. Herbert.    Can offer same day if slots are avail. Please manually check providers sched     2/5/24 BD

## 2024-02-07 ENCOUNTER — TELEPHONE (OUTPATIENT)
Dept: PHYSICAL MEDICINE AND REHAB | Facility: CLINIC | Age: 44
End: 2024-02-07
Payer: COMMERCIAL

## 2024-02-07 NOTE — TELEPHONE ENCOUNTER
EILEENM, sent MyC for pt regarding the appt adjustment for 3/11/24 with Dr. Herbert. Appt was scheduled for 11:00 am start, due to change in providers sched appt was moved to a 12:00 pm start.    Pt may resched for same day if slots are avail, or reschedule appt.    2/7/24 BD

## 2024-04-29 ENCOUNTER — VIRTUAL VISIT (OUTPATIENT)
Dept: PHYSICAL MEDICINE AND REHAB | Facility: CLINIC | Age: 44
End: 2024-04-29
Payer: COMMERCIAL

## 2024-04-29 DIAGNOSIS — U09.9 POST-COVID CHRONIC NEUROLOGIC SYMPTOMS: ICD-10-CM

## 2024-04-29 DIAGNOSIS — R29.90 POST-COVID CHRONIC NEUROLOGIC SYMPTOMS: ICD-10-CM

## 2024-04-29 DIAGNOSIS — M79.10 MYALGIA: Primary | ICD-10-CM

## 2024-04-29 DIAGNOSIS — U09.9 LONG COVID: ICD-10-CM

## 2024-04-29 DIAGNOSIS — R43.2 LOSS OF TASTE: ICD-10-CM

## 2024-04-29 PROCEDURE — 99214 OFFICE O/P EST MOD 30 MIN: CPT | Mod: 95 | Performed by: PHYSICIAN ASSISTANT

## 2024-04-29 RX ORDER — DULOXETIN HYDROCHLORIDE 20 MG/1
CAPSULE, DELAYED RELEASE ORAL
Qty: 194 CAPSULE | Refills: 0 | Status: SHIPPED | OUTPATIENT
Start: 2024-04-29 | End: 2024-08-11

## 2024-04-29 SDOH — SOCIAL STABILITY: SOCIAL NETWORK: I HAVE TROUBLE DOING ALL OF THE ACTIVITIES WITH FRIENDS THAT I WANT TO DO: SOMETIMES

## 2024-04-29 SDOH — SOCIAL STABILITY: SOCIAL NETWORK: I HAVE TROUBLE DOING ALL OF MY USUAL WORK (INCLUDE WORK AT HOME): SOMETIMES

## 2024-04-29 SDOH — SOCIAL STABILITY: SOCIAL NETWORK: I HAVE TROUBLE DOING ALL OF THE FAMILY ACTIVITIES THAT I WANT TO DO: SOMETIMES

## 2024-04-29 SDOH — SOCIAL STABILITY: SOCIAL NETWORK

## 2024-04-29 SDOH — SOCIAL STABILITY: SOCIAL NETWORK: PROMIS ABILITY TO PARTICIPATE IN SOCIAL ROLES & ACTIVITIES T-SCORE: 45

## 2024-04-29 SDOH — SOCIAL STABILITY: SOCIAL NETWORK: I HAVE TROUBLE DOING ALL OF MY REGULAR LEISURE ACTIVITIES WITH OTHERS: SOMETIMES

## 2024-04-29 ASSESSMENT — ANXIETY QUESTIONNAIRES
3. WORRYING TOO MUCH ABOUT DIFFERENT THINGS: NOT AT ALL
6. BECOMING EASILY ANNOYED OR IRRITABLE: SEVERAL DAYS
GAD7 TOTAL SCORE: 1
2. NOT BEING ABLE TO STOP OR CONTROL WORRYING: NOT AT ALL
GAD7 TOTAL SCORE: 1
GAD7 TOTAL SCORE: 1
4. TROUBLE RELAXING: NOT AT ALL
8. IF YOU CHECKED OFF ANY PROBLEMS, HOW DIFFICULT HAVE THESE MADE IT FOR YOU TO DO YOUR WORK, TAKE CARE OF THINGS AT HOME, OR GET ALONG WITH OTHER PEOPLE?: SOMEWHAT DIFFICULT
IF YOU CHECKED OFF ANY PROBLEMS ON THIS QUESTIONNAIRE, HOW DIFFICULT HAVE THESE PROBLEMS MADE IT FOR YOU TO DO YOUR WORK, TAKE CARE OF THINGS AT HOME, OR GET ALONG WITH OTHER PEOPLE: SOMEWHAT DIFFICULT
5. BEING SO RESTLESS THAT IT IS HARD TO SIT STILL: NOT AT ALL
1. FEELING NERVOUS, ANXIOUS, OR ON EDGE: NOT AT ALL
7. FEELING AFRAID AS IF SOMETHING AWFUL MIGHT HAPPEN: NOT AT ALL
7. FEELING AFRAID AS IF SOMETHING AWFUL MIGHT HAPPEN: NOT AT ALL

## 2024-04-29 ASSESSMENT — PATIENT HEALTH QUESTIONNAIRE - PHQ9
10. IF YOU CHECKED OFF ANY PROBLEMS, HOW DIFFICULT HAVE THESE PROBLEMS MADE IT FOR YOU TO DO YOUR WORK, TAKE CARE OF THINGS AT HOME, OR GET ALONG WITH OTHER PEOPLE: SOMEWHAT DIFFICULT
SUM OF ALL RESPONSES TO PHQ QUESTIONS 1-9: 6
SUM OF ALL RESPONSES TO PHQ QUESTIONS 1-9: 6

## 2024-04-29 NOTE — PATIENT INSTRUCTIONS
Post COVID Self Care Suggestions:     Fatigue Management:       https://www.archives-pmr.org/action/showPdf?gel=-5319%2819%7455840-5       Self Care:      [email or web address].[email or web address]/self-[email or web address]/  Recovery World Health Organization:    https://apps.who.int/iris/YouAppitream/handle/84648/070082/FKL-CBRH-2055-030-62432-72514-eng.pdf  Breathing exercises:    https://www.[facility]/health/conditions-and-diseases/coronavirus/coronavirus-recovery-breathing-exercises      Assessment of sense of smell and taste    Smell training:  Flowery - Evangelina  Fruity - Lemon  Spicy - Cloves  Resinous - Eucalyptus      1 - Pour a few droplets of one of the oils on to a cotton pad or ball  2 - Do not try to sniff the pad immediately; leave it for a few minutes for the fragrance to develop  3 - Hold the first stick/pad up to your nose, about an inch away.  The order in which you test the oils does not matter  4 - Relax and try to inhale naturally through the nose - sniffing too quickly and deeply is likely to result in you not being able to detect anything  5 - Try this a couple more times, then rest for five minutes  6 - Move on to the next oil and repeat as above.    After three months switch to a new set of odors: menthol, thyme, tangerine, and kristi, training with them twice daily.    After another three months, switch to a third new set of odors: green tea, bergamot, rosemary, and vinh, again training with them twice daily.    Studies:   [place] Paxlovid Grahn  https://medicine.[place].edu/cii/research/paxlc-study/?mibextid=Zxz2cZ    Cognitive Post-COVID study  z.[facility]/covid-ema    Supplements:  Fatigue- COQ10 100mg 3x day  ( side effects GI)  Brain fog-N-acetylcysteine 600 mg daily (side effects GI)

## 2024-04-29 NOTE — PROGRESS NOTES
Cherie Emanuel is a 43 year old female who presents to be evaluated for a billable video visit.    Video-Visit Details    Video visit Start time: 12:42 PM    Type of service:  Video Visit    Video End Time: 1:08 PM    Originating Location (pt. Location): Home    Distant Location (provider location):  Off- Site    Platform used for Video Visit: [facility]    Assessment/Impression   1. Myalgia/Post-COVID chronic neurologic symptoms  Patient with lingering myalgias and paresthesias.  Rheumatology and neurology work-up normal.  Encouraged to keep appointment with Dr. Veliz. Will prescribe Cymbalta 20mg starting 1x a day for 14 days and then increasing to BID. Due to patient being on Luvox currently discussed symptoms of serotonin syndrome to be aware of and contact either me or PCP if developing symptoms.  Side effects of Cymbalta discussed if she decides to start.  Will also place referral for Pain Psychology as well as PT to help patient maintain mobility. If she does have a form of fibromyalgia this will help with her pain.  Will recheck B6 as well.   - DULoxetine (CYMBALTA) 20 MG capsule; Take 1 capsule (20 mg) by mouth daily for 14 days, THEN 1 capsule (20 mg) 2 times daily for 90 days.  Dispense: 194 capsule; Refill: 0  - Physical Therapy  Referral; Future  - Adult Mental Health  Referral; Future  - Vitamin B6; Future    2. Loss of taste  Patient reports intermittent loss of taste that is transient.  Discussed olfactory training and provided information in patient instructions.  As patient had viral infection that triggered loss of taste initially discussed this is part of treatment.     3. Long COVID  Discussed COVID and Post COVID with patient.  Educational materials provided and all questions answered.  Discussed booster vaccination and risk for increasing myalgias, as she had these previously with previous vaccines.      Plan:  I reviewed present knowledge on long-Covid.  Education was provided  and question were answered.  Orders/Referrals as above  I will advised patient on test results  I will follow up with Cherie Emanuel in 2 months. I will review progress and consider need for any other therapeutic interventions. If there are any questions and/or concerns she will call the clinic.      On day of encounter time spent in chart review and with patient in consultation, exam, education,coordination of care,  review of outside charts/documentation and documentation:  35 minutes     I have attempted to proof read for major spelling errors and apologize for any minor errors I may have missed.     This note was dictated using voice recognition software. Any grammatical or context distortions are unintentional and inherent to the software.  _________________________________  Nidhi Herbert PA-C  [facility] PHYSICAL MEDICINE AND REHABILITATION CLINIC [facility]   Patient returns for follow-up.  Briefly was initially seen May 2022.  At that visit she told me she had developed a viral infection September 11, 2020 where she lost her taste and smell and had blurred vision her COVID test was negative at this time a month later she developed her myalgias which have continued since patient did get a COVID-vaccine in May 2021 and this intensified her pain for 6 weeks she got her second shot which did cause pain but not to the same extent as the first.  She most recently saw Dr. Friedman in December of 2022.  At this visit she had seen rheumatology with a negative work-up. She was working with neurology and a biopsy was negative with full work-up. She was reporting waxing and waning symptoms fatigue with stable and concentration with stable.  Today she reports continues muscle pain and sleeps mostly on her stomach. Sleeping on side or back causes pain.  She had a nerve biopsy in September 2022 which was normal.   Patient has constant aching pain.   Patient developed this symptoms after a  loss of taste and smell in .     Today ,24, patient still having lots of pain. Patient cannot tolerate leggings due to aching legs.  She cannot wear heavy material either.  She wears light loose fabric. Patient has poor quality of life due to her pain.  Patient is having worsening paraesthesias.  She is doing some light exercising and this can help a little. Smell and taste is ok and is coming and going.  She is working on decreasing processed sugar.  Sleep is still poor due to pain.     Current concerns: Health Concerns        2024    12:25 PM   PHQ Assesment Total Score(s)   PHQ-9 Score 6           2024    12:26 PM   SANDRA-7 Results   SANDRA 7 TOTAL SCORE 1 (minimal anxiety)   SANDRA-7 Total Score 1         2024    12:27 PM   PTSD Screen Score   Have you ever experienced this kind of event? No         2024    12:30 PM   PROMIS-29   PROMIS Physical Function T-Score 42 (mild dysfunction)   PROMIS Anxiety T-Score 60 (mild)   PROMIS Depression T-Score 59 (mild)   PROMIS Fatigue T-Score 63 (moderate)   PROMIS Sleep Disturbance T-Score 62 (moderate)   PROMIS Ability to Participate in Social Roles & Activities T-Score 45 (within normal limits)   PROMIS Pain Interference T-Score 65 (moderate)   PROMIS Pain Intensity 6         Past Medical History:   Diagnosis Date    Depression     took fluoxetine for 1 year. Stopped due to conception concerns. stable off the        Past Surgical History:   Procedure Laterality Date    MOUTH SURGERY      wisdom teeth       Family History   Problem Relation Age of Onset    Diabetes Maternal Grandfather         overweight    Heart Failure Maternal Grandfather     Cancer Paternal Grandmother         ? breast cancer    C.A.D. Paternal Grandfather          of MI in 70's       Social History     Tobacco Use    Smoking status: Never    Smokeless tobacco: Never   Vaping Use    Vaping status: Never Used   Substance Use Topics    Alcohol use: Yes     Comment: occ, maybe  glass of wine per week    Drug use: Never         Current Outpatient Medications:     magnesium carbonate (MAGONATE) 200 mg/ml liquid, , Disp: , Rfl:     NO ACTIVE MEDICATIONS, , Disp: , Rfl:     fluvoxaMINE (LUVOX) 50 MG tablet, Take 50 mg by mouth, Disp: , Rfl:     Review of Systems   Constitutional, HEENT, cardiovascular, pulmonary, GI, , musculoskeletal, neuro, skin, endocrine and psych systems are negative, except as otherwise noted.      Objective         Vitals:  No vitals were obtained today due to virtual visit.    Physical Exam   EYES: Eyes grossly normal to inspection.  No discharge or erythema, or obvious scleral/conjunctival abnormalities.  SKIN: Visible skin clear. No significant rash, abnormal pigmentation or lesions.  NEURO: Cranial nerves grossly intact.  Mentation and speech appropriate for age.  GENERAL: Healthy, alert and no distress  RESP: No audible wheeze, cough, or visible cyanosis.  No visible retractions or increased work of breathing.    PSYCH: Mentation appears normal, affect normal/bright, judgement and insight intact, normal speech and appearance well-groomed.    Labs: [facility]     Comprehensive Metabolic Panel  Component  Ref Range & Units 1 mo ago Resulting Agency   Sodium  136 - 145 mmol/L 145 [facility]   Potassium  3.5 - 5.1 mmol/L 4.2 [facility]   Chloride  98 - 109 mmol/L 110 High  [facility]   CO2  20 - 29 mmol/L 28 [facility]   Anion Gap  7 - 16 mmol/L 7 [facility]   Calcium  8.4 - 10.4 mg/dL 9.7 [facility]   BUN  7 - 26 mg/dL 14 [facility]   Creatinine  0.55 - 1.02 mg/dL 0.73 [facility]   Alkaline Phosphatase  40 - 150 U/L 41 [facility]   AST (SGOT)  10 - 40 U/L 28 [facility]   ALT (SGPT)  <=55 U/L 45 [facility]   Bilirubin, Total  0.2 - 1.2 mg/dL 0.5 [facility]   Protein, Total  6.4 - 8.3 g/dL 7.0 [facility]   Albumin  3.5 -  5.0 g/dL 4.4 [facility]   Glucose  70 - 100 mg/dL 92 [facility]   Comment: The given reference range is for the fasting state. Non-fasting reference range for glucose is 70 - 180 mg/dL.   GFR, Estimated  >60 mL/min/1.73m2 >60 [facility]   Hours Fasting  8 - 12 Hours 11.0 [facility]     Specimen Collected: 03/18/24 11:11 AM     TSH  Component  Ref Range & Units 1 mo ago   TSH, Sensitive  0.30 - 4.50 uIU/mL 2.64   Resulting [facility]     Specimen Collected: 03/18/24 11:11 AM     Lipid Panel & Direct LDL (if Needed)  Component  Ref Range & Units 1 mo ago [facility]   Cholesterol  0 - 199 mg/dL 149 [facility]   Triglyceride  <=149 mg/dL 49 [facility]   HDL Cholesterol  >=40 mg/dL 60 [facility]   LDL, Calculated  <130 mg/dL 79 [facility]   Non HDL Chol, Calculated  <=159 mg/dL 89 [facility]   Cholesterol/HDL Ratio  <=5.0 2.5 [facility]   Hours Fasting  8 - 12 Hours 11.0 [facility]     Specimen Collected: 03/18/24 11:11 AM     Imaging:    I personally reviewed the following imaging results today and those on care everywhere, if indicated     Nothing new to review    Reviewed imaging from [facility] sites     Medical Records Reviewed:    Reviewed consults/documents from [facility] including  Family Medicine

## 2024-04-29 NOTE — NURSING NOTE
Is the patient currently in the state of [place]? YES    Visit mode:VIDEO    If the visit is dropped, the patient can be reconnected by: TELEPHONE VISIT: Phone number:   Telephone Information:   Mobile [phone]       Will anyone else be joining the visit? NO  (If patient encounters technical issues they should call [phone]    How would you like to obtain your AVS? MyChart    Are changes needed to the allergy or medication list? Pt stated no med changes    Are refills needed on medications prescribed by this physician? NO    Reason for visit: Video Visit (3 month follow-up )    Charlotte KELLER

## 2024-04-29 NOTE — LETTER
4/29/2024       RE: Cherie Emanuel  [address] [facility] [id number]       Dear Colleague,    Thank you for referring your patient, Cherie Emanuel, to the [facility] PHYSICAL MEDICINE AND REHABILITATION CLINIC [place] at [facility]. Please see a copy of my visit note below.    Cherie Emanuel is a 43 year old female who presents to be evaluated for a billable video visit.    Assessment/Impression   1. Myalgia/Post-COVID chronic neurologic symptoms  Patient with lingering myalgias and paresthesias.  Rheumatology and neurology work-up normal.  Encouraged to keep appointment with Dr. Veliz. Will prescribe Cymbalta 20mg starting 1x a day for 14 days and then increasing to BID. Due to patient being on Luvox currently discussed symptoms of serotonin syndrome to be aware of and contact either me or PCP if developing symptoms.  Side effects of Cymbalta discussed if she decides to start.  Will also place referral for Pain Psychology as well as PT to help patient maintain mobility. If she does have a form of fibromyalgia this will help with her pain.  Will recheck B6 as well.   - DULoxetine (CYMBALTA) 20 MG capsule; Take 1 capsule (20 mg) by mouth daily for 14 days, THEN 1 capsule (20 mg) 2 times daily for 90 days.  Dispense: 194 capsule; Refill: 0  - Physical Therapy  Referral; Future  - Adult Mental Health  Referral; Future  - Vitamin B6; Future    2. Loss of taste  Patient reports intermittent loss of taste that is transient.  Discussed olfactory training and provided information in patient instructions.  As patient had viral infection that triggered loss of taste initially discussed this is part of treatment.     3. Long COVID  Discussed COVID and Post COVID with patient.  Educational materials provided and all questions answered.  Discussed booster vaccination and risk for increasing myalgias, as she had these previously with previous  vaccines.      Plan:  I reviewed present knowledge on long-Covid.  Education was provided and question were answered.  Orders/Referrals as above  I will advised patient on test results  I will follow up with Cherie Emanuel in 2 months. I will review progress and consider need for any other therapeutic interventions. If there are any questions and/or concerns she will call the clinic.      On day of encounter time spent in chart review and with patient in consultation, exam, education,coordination of care,  review of outside charts/documentation and documentation:  35 minutes     I have attempted to proof read for major spelling errors and apologize for any minor errors I may have missed.     This note was dictated using voice recognition software. Any grammatical or context distortions are unintentional and inherent to the software.  _________________________________  Nidhi Herbert PA-C  [facility] PHYSICAL MEDICINE AND REHABILITATION CLINIC [facility]   Patient returns for follow-up.  Briefly was initially seen May 2022.  At that visit she told me she had developed a viral infection September 11, 2020 where she lost her taste and smell and had blurred vision her COVID test was negative at this time a month later she developed her myalgias which have continued since patient did get a COVID-vaccine in May 2021 and this intensified her pain for 6 weeks she got her second shot which did cause pain but not to the same extent as the first.  She most recently saw Dr. Friedman in December of 2022.  At this visit she had seen rheumatology with a negative work-up. She was working with neurology and a biopsy was negative with full work-up. She was reporting waxing and waning symptoms fatigue with stable and concentration with stable.  Today she reports continues muscle pain and sleeps mostly on her stomach. Sleeping on side or back causes pain.  She had a nerve biopsy in September 2022 which  was normal.   Patient has constant aching pain.   Patient developed this symptoms after a loss of taste and smell in .     Today ,24, patient still having lots of pain. Patient cannot tolerate leggings due to aching legs.  She cannot wear heavy material either.  She wears light loose fabric. Patient has poor quality of life due to her pain.  Patient is having worsening paraesthesias.  She is doing some light exercising and this can help a little. Smell and taste is ok and is coming and going.  She is working on decreasing processed sugar.  Sleep is still poor due to pain.     Current concerns: Health Concerns        2024    12:25 PM   PHQ Assesment Total Score(s)   PHQ-9 Score 6           2024    12:26 PM   SANDRA-7 Results   SANDRA 7 TOTAL SCORE 1 (minimal anxiety)   SANDRA-7 Total Score 1         2024    12:27 PM   PTSD Screen Score   Have you ever experienced this kind of event? No         2024    12:30 PM   PROMIS-29   PROMIS Physical Function T-Score 42 (mild dysfunction)   PROMIS Anxiety T-Score 60 (mild)   PROMIS Depression T-Score 59 (mild)   PROMIS Fatigue T-Score 63 (moderate)   PROMIS Sleep Disturbance T-Score 62 (moderate)   PROMIS Ability to Participate in Social Roles & Activities T-Score 45 (within normal limits)   PROMIS Pain Interference T-Score 65 (moderate)   PROMIS Pain Intensity 6         Past Medical History:   Diagnosis Date    Depression     took fluoxetine for 1 year. Stopped due to conception concerns. stable off the        Past Surgical History:   Procedure Laterality Date    MOUTH SURGERY      wisdom teeth       Family History   Problem Relation Age of Onset    Diabetes Maternal Grandfather         overweight    Heart Failure Maternal Grandfather     Cancer Paternal Grandmother         ? breast cancer    C.A.D. Paternal Grandfather          of MI in 70's       Social History     Tobacco Use    Smoking status: Never    Smokeless tobacco: Never   Vaping Use     Vaping status: Never Used   Substance Use Topics    Alcohol use: Yes     Comment: occ, maybe glass of wine per week    Drug use: Never         Current Outpatient Medications:     magnesium carbonate (MAGONATE) 200 mg/ml liquid, , Disp: , Rfl:     NO ACTIVE MEDICATIONS, , Disp: , Rfl:     fluvoxaMINE (LUVOX) 50 MG tablet, Take 50 mg by mouth, Disp: , Rfl:     Review of Systems   Constitutional, HEENT, cardiovascular, pulmonary, GI, , musculoskeletal, neuro, skin, endocrine and psych systems are negative, except as otherwise noted.      Objective         Vitals:  No vitals were obtained today due to virtual visit.    Physical Exam   EYES: Eyes grossly normal to inspection.  No discharge or erythema, or obvious scleral/conjunctival abnormalities.  SKIN: Visible skin clear. No significant rash, abnormal pigmentation or lesions.  NEURO: Cranial nerves grossly intact.  Mentation and speech appropriate for age.  GENERAL: Healthy, alert and no distress  RESP: No audible wheeze, cough, or visible cyanosis.  No visible retractions or increased work of breathing.    PSYCH: Mentation appears normal, affect normal/bright, judgement and insight intact, normal speech and appearance well-groomed.    Labs: [facility]     Comprehensive Metabolic Panel  Component  Ref Range & Units 1 mo ago Resulting Agency   Sodium  136 - 145 mmol/L 145 [facility]   Potassium  3.5 - 5.1 mmol/L 4.2 [facility]   Chloride  98 - 109 mmol/L 110 [facility]  [facility]   CO2  20 - 29 mmol/L 28 [facility]   Anion Gap  7 - 16 mmol/L 7 [facility]   Calcium  8.4 - 10.4 mg/dL 9.7 [facility]   BUN  7 - 26 mg/dL 14 [facility]   Creatinine  0.55 - 1.02 mg/dL 0.73 [facility]   Alkaline Phosphatase  40 - 150 U/L 41 [facility]   AST (SGOT)  10 - 40 U/L 28 [facility]   ALT (SGPT)  <=55 U/L 45 [facility]   Bilirubin, Total  0.2 - 1.2 mg/dL 0.5  [facility]   Protein, Total  6.4 - 8.3 g/dL 7.0 [facility]   Albumin  3.5 - 5.0 g/dL 4.4 [facility]   Glucose  70 - 100 mg/dL 92 [facility]   Comment: The given reference range is for the fasting state. Non-fasting reference range for glucose is 70 - 180 mg/dL.   GFR, Estimated  >60 mL/min/1.73m2 >60 [facility]   Hours Fasting  8 - 12 Hours 11.0 [facility]     Specimen Collected: 03/18/24 11:11 AM     TSH  Component  Ref Range & Units 1 mo ago   TSH, Sensitive  0.30 - 4.50 uIU/mL 2.64   Resulting [facility]     Specimen Collected: 03/18/24 11:11 AM     Lipid Panel & Direct LDL (if Needed)  Component  Ref Range & Units 1 mo ago [facility]   Cholesterol  0 - 199 mg/dL 149 [facility]   Triglyceride  <=149 mg/dL 49 [facility]   HDL Cholesterol  >=40 mg/dL 60 [facility]   LDL, Calculated  <130 mg/dL 79 [facility]   Non HDL Chol, Calculated  <=159 mg/dL 89 [facility]   Cholesterol/HDL Ratio  <=5.0 2.5 [facility]   Hours Fasting  8 - 12 Hours 11.0 [facility]     Specimen Collected: 03/18/24 11:11 AM     Imaging:    I personally reviewed the following imaging results today and those on care everywhere, if indicated     Nothing new to review    Reviewed imaging from [facility] sites     Medical Records Reviewed:    Reviewed consults/documents from [facility] including  Family Medicine            Again, thank you for allowing me to participate in the care of your patient.      Sincerely,    Nidhi Herbert PA-C

## 2024-05-28 ENCOUNTER — OFFICE VISIT (OUTPATIENT)
Dept: PHYSICAL MEDICINE AND REHAB | Facility: CLINIC | Age: 44
End: 2024-05-28
Payer: COMMERCIAL

## 2024-05-28 VITALS
HEART RATE: 77 BPM | SYSTOLIC BLOOD PRESSURE: 107 MMHG | DIASTOLIC BLOOD PRESSURE: 63 MMHG | OXYGEN SATURATION: 97 % | RESPIRATION RATE: 16 BRPM

## 2024-05-28 DIAGNOSIS — U09.9 POST-COVID CHRONIC NEUROLOGIC SYMPTOMS: ICD-10-CM

## 2024-05-28 DIAGNOSIS — G47.9 SLEEP DIFFICULTIES: ICD-10-CM

## 2024-05-28 DIAGNOSIS — R29.90 POST-COVID CHRONIC NEUROLOGIC SYMPTOMS: ICD-10-CM

## 2024-05-28 DIAGNOSIS — G62.9 NEUROPATHY: ICD-10-CM

## 2024-05-28 DIAGNOSIS — M79.2 NEUROPATHIC PAIN: Primary | ICD-10-CM

## 2024-05-28 PROCEDURE — 99214 OFFICE O/P EST MOD 30 MIN: CPT | Performed by: PHYSICAL MEDICINE & REHABILITATION

## 2024-05-28 RX ORDER — GABAPENTIN 300 MG/1
300 CAPSULE ORAL AT BEDTIME
Qty: 90 CAPSULE | Refills: 3 | Status: SHIPPED | OUTPATIENT
Start: 2024-05-28 | End: 2025-05-23

## 2024-05-28 NOTE — PROGRESS NOTES
PM&R Clinic Note     Patient Name: Cherie Emanuel : 1980 Medical Record: [id number]     Requesting Physician/clinician: Nidhi Herbert PA-C            History of Present Illness:     Cherie Emanuel is a 43 year old female who presented to clinic today for more evaluation and management of diffuse neuropathic pain and myalgia.     She has history of myalgia and post-COVID chronic neurological symptoms and is closely followed by Nidhi Herbert PA-C in our post-COVID clinic    Per chart review, her pain started in Sep 2020. Prior to that she had 2-3 days with loss of sense of taste and some blurry vision. Several weeks later it was uncomfortable to lay down and she could not maintain a supine position or sitting position for prolonged periods of time.  Every surface felt hard and uncomfortable.  This interfered with her sleep.  Her feet would feel heavy and would feel uncomfortable on the tile floor.  Pain sensation has been generally stable but with intermittent exacerbations associated with COVID vaccination or infection.  Pain temporarily worsened after she had an acute COVID infection in 2022.  A couple of times she has lost her sense of taste accompanied by pain worsening but without any other overt respiratory symptoms.       She has tingling of hands and feet that has improved since time of onset.  Tingling started spring 2021 with simultaneous onset at hands and feet (up to calf level on both legs, mostly at hand level but occasional extension to forearm level).  Tingling was consistently present for many months.  Tingling sensation started improving January or 2022.  Tingling is almost resolved currently, she occasionally feels tingling in the bottoms of her feet.     Several months ago she developed sharp shooting pain in her hands and feet.  She would have sudden onset sharp pain in her hands and feet that would last less than a second.  These sensations would occur 20-30  times daily.  After starting fluvoxamine June 2022, the painful sensations sensations completely resolved.     She had numbness in left greater than right lateral thigh when she was taking a B vitamin complex for a couple of weeks.  Numbness resolved when she stopped taking the B vitamin.  She denies current numbness.     She has had constipation her whole life.  She experienced intermittent heat and cold insensitivity starting fall 2020 manifested as discomfort in hot tub, intolerance of sitting on the beach, intolerance of going out in the cold last winter.  She denies orthostatic dizziness or recent syncopal episodes.     She denies frequent cramps, fasciculations, or difficulty with muscle stiffness and muscle relaxation. Speech and swallowing are normal. Appetite is good and weight is relatively stable. She denies breathing difficulties or shortness of breath while lying flat. Mood and affect are appropriate. She is independent, has no assistive devices, able to ambulate independently, and is not falling. She denies sleep complaints or restless leg symptoms.      She was seen and evaluated by rheumatology team in 7/2022 and work-up was negative for any inflammatory causes.     She was also seen and evaluated in the neuromuscular clinic 8/2022 and again full work-up including skin biopsy 9/2022 was unremarkable. Skin biopsy showed normal calf IENFD. Finding argues against a SFN and is prognostically favorable.     Neurosurgery and orthopedic teams were also involved in her care.      Symptoms,  Today, she noted that she feels like she has more neve pain than muscle pain. She still gets tingling sensation with specific cloths and this interrupts her sleep very much. Even taking shower is a challenge and triggers the tingling sensation. She never feels fully rested.     Nerve pain in her face is more intermittent. Always hurts to sleep on her stomach so she is constantly sleeping on her back. Her nipples are very  sensitive at all times and tingling sensation in that area never goes away. Sleeping on her back is also difficult because she feels like sleeping on cement. She has done a lot of padding for her bed and basically has to take them anywhere that they go when travel. She has to use a cushion for any chair that she uses.     She is overall coping but definitely feels trapper by these altered and uncomfortable sensations.   She has episodes of stress incontinence but overall has full control on her bowel and bladder function.   No falls or LOB.   She has slight and intermittent blurry vision.   Her sense of tasted has not been back since 2020.      Meds/injections,   Cymbalta - has not started yet   Tried gabapentin but short time and low dose   Takes tylenol and ibuprofen with no benefits   No injections in the past       Therapies/HEP/equipments,  Will start PT in June.      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   Right hand-dominant   Drives            Past Medical and Surgical History:     Past Medical History:   Diagnosis Date    Depression 2010    took fluoxetine for 1 year. Stopped due to conception concerns. stable off the      Past Surgical History:   Procedure Laterality Date    MOUTH SURGERY      wisdom teeth            Social History:     Social History     Tobacco Use    Smoking status: Never    Smokeless tobacco: Never   Substance Use Topics    Alcohol use: Yes     Comment: occ, maybe glass of wine per week              Family History:     Family History   Problem Relation Age of Onset    Diabetes Maternal Grandfather         overweight    Heart Failure Maternal Grandfather     Cancer Paternal Grandmother         ?  "breast cancer    C.A.D. Paternal Grandfather          of MI in 70's            Medications:     Current Outpatient Medications   Medication Sig Dispense Refill    DULoxetine (CYMBALTA) 20 MG capsule Take 1 capsule (20 mg) by mouth daily for 14 days, THEN 1 capsule (20 mg) 2 times daily for 90 days. 194 capsule 0    gabapentin (NEURONTIN) 300 MG capsule Take 1 capsule (300 mg) by mouth at bedtime for 360 days 90 capsule 3    magnesium carbonate (MAGONATE) 200 mg/ml liquid       NO ACTIVE MEDICATIONS       fluvoxaMINE (LUVOX) 50 MG tablet Take 50 mg by mouth              Allergies:     No Known Allergies           ROS:     A focused ROS is negative other than the symptoms noted above in the HPI.             Physical Examiniation:     VITAL SIGNS: /63 (BP Location: Right arm, Patient Position: Sitting, Cuff Size: Adult Regular)   Pulse 77   Resp 16   SpO2 97%   BMI: Estimated body mass index is 23.3 kg/m  as calculated from the following:    Height as of 22: 1.651 m (5' 5\").    Weight as of 10/12/23: 63.5 kg (140 lb).    Gen: NAD, pleasant and cooperative   Cardio: regular pulse  Pulm: non-labored breathing in room air  Abd: benign  Ext: WWP, no edema in BLE, no tenderness in calves  Neuro/MSK:   Strength was 5/5 at bilateral upper and lower extremities    Sensation was intact to LT and she was able to localize touch with closed eyes- has dysesthesia in feet and hands  Reflexes were 1-2+ and symmetric at bilateral upper extremities    No clonus at ankles; negative Pascal b/l            Laboratory/Imaging:     Prior pertinent laboratory work-up:  2021: normal vitamin B6, vitamin B12, vitamin E, copper, A1C, TSH, SPEP, ESR, CRP, KETTY, COLBY panel  2021: low vitamin D (26.8), normal ESR, CRP, rheumatoid factor, ANCA, folic acid, thiamine, LFTs, Lyme, myasthenia antibody panel, myomarker 3 antibody panel (anti-Charley-1, anti-PL-7, anti-PL-12, anti-EJ, anti-OJ, anti-SRP, anti-MI-2, anti-TIF-1gamma, " anti-MDA-5, anti-NXP-2, anti-SAE1, anti-PM/SCL-100, anti-Ku, anti-SSA, anti-U1RNP, anti-U2RNP, anti-U3RNP)  4/26/2022: normal KETTY, rheumatoid factor, CCP, CRP, ESR, tryptase, histamine, aldolase, hepatitis C, CK  5/23/2022: normal vitamin B6, MMA, B12     Prior pertinent radiology work-up:  3/21/2021: MRI brain wo/w contrast was normal (no raw images available)  5/3/2021: MRI cervical and thoracic spine showed T11-12 mild-moderate central stenosis secondary to an extruded central disc herniation abutting and flattening the ventral cord.  There was a C6-7 extruded left disc herniation moderately impingint hte left C7 nerve root, causing mild to moderate asymmetric left canal stenosis with left ventral cord abutment/flattening.  (no raw images available)  9/9/2021: MRI cervical spine without contrast showed unchanged C6-7 disc bulge and mild spinal canal stenosis and mild/moderate left foraminal narrowing (no raw images available)     Prior electrophysiologic work-up:  6/29/2021: Nerve conduction studies/EMG performed by Dr. Torres of the left upper and lower extremities were normal           Assessment/Plan:     Initial presentation with possible COVID symptoms 9/2020 - negative test  First  vaccine 5/2021  First formal diagnosis of COVID infection 2/2022  Diffuse neuropathic symptoms and pain      I fully understand her frustration with the absence of a clear diagnosis for her diffuse neuropathic symptoms. I explained that such scenarios are not uncommon in the medical field, particularly in the context of post-COVID conditions. It appears that further diagnostic work is not necessary at this stage, and our focus should be on management.    Her treatment options have been limited, primarily due to her plans to get pregnant. However, her symptoms are significantly affecting her quality of life, and I believe it is important to have a relatively more aggressive approach to managing her symptoms for about 6-9 months  pending her course. Discussed long term complications of untreated chronic pain and central sensitization. She has already been dealing with these symptoms for 3+ years.         Patient education: In depth discussion and education was provided about the assessment and implications of each of the below recommendations for management. Patient indicated readiness to learn, all questions were answered and understanding of material presented was confirmed.    Work-up: none     Therapy/equipment/braces: sent a message to [facility] her PT regarding desensitization options and trial of modalities     Medications: started gabapentin 300mg at bedtime to help with sleep and will adjust the dose at our next visit; discussed other options as well     Interventions: we can consider TPIs but her pain is very diffuse    Referral / follow up with other providers: MTM referral to gradually taper off fluvoxamine and incrementally increase her Cymbalta dose in order to minimize risk of side effects with both on board (sent a message to Lala). Our pain psychologists accept referrals only from the pain providers in the pain clinic; told her that I will ask for some outside options and discuss again at next visit     Follow up: in 3-4 weeks to discuss her response to meds - I will continue to follow until her med regimen is stable then she can continue to follow up with Nidhi Veliz MD  Physical Medicine & Rehabilitation    **  A number of therapists can be found in your local community through [email or web address]. I was able to filter results for chronic pain management and found some therapist in [place] who also provider online services.       **  06/03/24   Jo Ann Locke,    Thank you for reaching out about this case. At this point in time I am only seeing vestibular or concussion caseload at this clinic. These are definitely interventions that can be offered at this clinic. I would highly recommend my coworkers  Cherie Don or Pat De La Vega for these specific treatments. I can work with our  to get this patient transferred and rescheduled to one of their scheduled for maximal benefit from PT interventions.     Alber,  Rula

## 2024-05-28 NOTE — LETTER
2024       RE: Cherie Emanuel  [id number]  [facility] [address]       Dear Colleague,    Thank you for referring your patient, Cherie Emanuel, to the [facility] PHYSICAL MEDICINE AND REHABILITATION CLINIC [place] at [facility]. Please see a copy of my visit note below.           PM&R Clinic Note     Patient Name: Cherie Emanuel : 1980 Medical Record: [id number]     Requesting Physician/clinician: Nidhi Herbert PA-C            History of Present Illness:     Cherie Emaneul is a 43 year old female who presented to clinic today for more evaluation and management of diffuse neuropathic pain and myalgia.     She has history of myalgia and post-COVID chronic neurological symptoms and is closely followed by Nidhi Herbert PA-C in our post-COVID clinic    Per chart review, her pain started in Sep 2020. Prior to that she had 2-3 days with loss of sense of taste and some blurry vision. Several weeks later it was uncomfortable to lay down and she could not maintain a supine position or sitting position for prolonged periods of time.  Every surface felt hard and uncomfortable.  This interfered with her sleep.  Her feet would feel heavy and would feel uncomfortable on the tile floor.  Pain sensation has been generally stable but with intermittent exacerbations associated with COVID vaccination or infection.  Pain temporarily worsened after she had an acute COVID infection in 2022.  A couple of times she has lost her sense of taste accompanied by pain worsening but without any other overt respiratory symptoms.       She has tingling of hands and feet that has improved since time of onset.  Tingling started spring 2021 with simultaneous onset at hands and feet (up to calf level on both legs, mostly at hand level but occasional extension to forearm level).  Tingling was consistently present for many months.  Tingling sensation started improving  January or February 2022.  Tingling is almost resolved currently, she occasionally feels tingling in the bottoms of her feet.     Several months ago she developed sharp shooting pain in her hands and feet.  She would have sudden onset sharp pain in her hands and feet that would last less than a second.  These sensations would occur 20-30 times daily.  After starting fluvoxamine June 2022, the painful sensations sensations completely resolved.     She had numbness in left greater than right lateral thigh when she was taking a B vitamin complex for a couple of weeks.  Numbness resolved when she stopped taking the B vitamin.  She denies current numbness.     She has had constipation her whole life.  She experienced intermittent heat and cold insensitivity starting fall 2020 manifested as discomfort in hot tub, intolerance of sitting on the beach, intolerance of going out in the cold last winter.  She denies orthostatic dizziness or recent syncopal episodes.     She denies frequent cramps, fasciculations, or difficulty with muscle stiffness and muscle relaxation. Speech and swallowing are normal. Appetite is good and weight is relatively stable. She denies breathing difficulties or shortness of breath while lying flat. Mood and affect are appropriate. She is independent, has no assistive devices, able to ambulate independently, and is not falling. She denies sleep complaints or restless leg symptoms.      She was seen and evaluated by rheumatology team in 7/2022 and work-up was negative for any inflammatory causes.     She was also seen and evaluated in the neuromuscular clinic 8/2022 and again full work-up including skin biopsy 9/2022 was unremarkable. Skin biopsy showed normal calf IENFD. Finding argues against a SFN and is prognostically favorable.     Neurosurgery and orthopedic teams were also involved in her care.      Symptoms,  Today, she noted that she feels like she has more neve pain than muscle pain. She  still gets tingling sensation with specific cloths and this interrupts her sleep very much. Even taking shower is a challenge and triggers the tingling sensation. She never feels fully rested.     Nerve pain in her face is more intermittent. Always hurts to sleep on her stomach so she is constantly sleeping on her back. Her nipples are very sensitive at all times and tingling sensation in that area never goes away. Sleeping on her back is also difficult because she feels like sleeping on cement. She has done a lot of padding for her bed and basically has to take them anywhere that they go when travel. She has to use a cushion for any chair that she uses.     She is overall coping but definitely feels trapper by these altered and uncomfortable sensations.   She has episodes of stress incontinence but overall has full control on her bowel and bladder function.   No falls or LOB.   She has slight and intermittent blurry vision.   Her sense of tasted has not been back since 2020.      Meds/injections,   Cymbalta - has not started yet   Tried gabapentin but short time and low dose   Takes tylenol and ibuprofen with no benefits   No injections in the past       Therapies/HEP/equipments,  Will start PT in June.      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   Right hand-dominant   Drives            Past Medical and Surgical History:     Past Medical History:   Diagnosis Date    Depression 2010    took fluoxetine for 1 year. Stopped due to conception concerns. stable off the      Past Surgical History:   Procedure Laterality Date    MOUTH SURGERY      wisdom teeth            Social History:     Social History     Tobacco Use    Smoking  "status: Never    Smokeless tobacco: Never   Substance Use Topics    Alcohol use: Yes     Comment: occ, maybe glass of wine per week              Family History:     Family History   Problem Relation Age of Onset    Diabetes Maternal Grandfather         overweight    Heart Failure Maternal Grandfather     Cancer Paternal Grandmother         ? breast cancer    C.A.D. Paternal Grandfather          of MI in 70's            Medications:     Current Outpatient Medications   Medication Sig Dispense Refill    DULoxetine (CYMBALTA) 20 MG capsule Take 1 capsule (20 mg) by mouth daily for 14 days, THEN 1 capsule (20 mg) 2 times daily for 90 days. 194 capsule 0    gabapentin (NEURONTIN) 300 MG capsule Take 1 capsule (300 mg) by mouth at bedtime for 360 days 90 capsule 3    magnesium carbonate (MAGONATE) 200 mg/ml liquid       NO ACTIVE MEDICATIONS       fluvoxaMINE (LUVOX) 50 MG tablet Take 50 mg by mouth              Allergies:     No Known Allergies           ROS:     A focused ROS is negative other than the symptoms noted above in the HPI.             Physical Examiniation:     VITAL SIGNS: /63 (BP Location: Right arm, Patient Position: Sitting, Cuff Size: Adult Regular)   Pulse 77   Resp 16   SpO2 97%   BMI: Estimated body mass index is 23.3 kg/m  as calculated from the following:    Height as of 22: 1.651 m (5' 5\").    Weight as of 10/12/23: 63.5 kg (140 lb).    Gen: NAD, pleasant and cooperative   Cardio: regular pulse  Pulm: non-labored breathing in room air  Abd: benign  Ext: WWP, no edema in BLE, no tenderness in calves  Neuro/MSK:   Strength was 5/5 at bilateral upper and lower extremities    Sensation was intact to LT and she was able to localize touch with closed eyes- has dysesthesia in feet and hands  Reflexes were 1-2+ and symmetric at bilateral upper extremities    No clonus at ankles; negative Pascal b/l            Laboratory/Imaging:     Prior pertinent laboratory work-up:  2021: normal " vitamin B6, vitamin B12, vitamin E, copper, A1C, TSH, SPEP, ESR, CRP, KETTY, COLBY panel  11/9/2021: low vitamin D (26.8), normal ESR, CRP, rheumatoid factor, ANCA, folic acid, thiamine, LFTs, Lyme, myasthenia antibody panel, myomarker 3 antibody panel (anti-Charley-1, anti-PL-7, anti-PL-12, anti-EJ, anti-OJ, anti-SRP, anti-MI-2, anti-TIF-1gamma, anti-MDA-5, anti-NXP-2, anti-SAE1, anti-PM/SCL-100, anti-Ku, anti-SSA, anti-U1RNP, anti-U2RNP, anti-U3RNP)  4/26/2022: normal KETTY, rheumatoid factor, CCP, CRP, ESR, tryptase, histamine, aldolase, hepatitis C, CK  5/23/2022: normal vitamin B6, MMA, B12     Prior pertinent radiology work-up:  3/21/2021: MRI brain wo/w contrast was normal (no raw images available)  5/3/2021: MRI cervical and thoracic spine showed T11-12 mild-moderate central stenosis secondary to an extruded central disc herniation abutting and flattening the ventral cord.  There was a C6-7 extruded left disc herniation moderately impingint hte left C7 nerve root, causing mild to moderate asymmetric left canal stenosis with left ventral cord abutment/flattening.  (no raw images available)  9/9/2021: MRI cervical spine without contrast showed unchanged C6-7 disc bulge and mild spinal canal stenosis and mild/moderate left foraminal narrowing (no raw images available)     Prior electrophysiologic work-up:  6/29/2021: Nerve conduction studies/EMG performed by Dr. Torres of the left upper and lower extremities were normal           Assessment/Plan:     Initial presentation with possible COVID symptoms 9/2020 - negative test  First  vaccine 5/2021  First formal diagnosis of COVID infection 2/2022  Diffuse neuropathic symptoms and pain      I fully understand her frustration with the absence of a clear diagnosis for her diffuse neuropathic symptoms. I explained that such scenarios are not uncommon in the medical field, particularly in the context of post-COVID conditions. It appears that further diagnostic work is not  necessary at this stage, and our focus should be on management.    Her treatment options have been limited, primarily due to her plans to get pregnant. However, her symptoms are significantly affecting her quality of life, and I believe it is important to have a relatively more aggressive approach to managing her symptoms for about 6-9 months pending her course. Discussed long term complications of untreated chronic pain and central sensitization. She has already been dealing with these symptoms for 3+ years.         Patient education: In depth discussion and education was provided about the assessment and implications of each of the below recommendations for management. Patient indicated readiness to learn, all questions were answered and understanding of material presented was confirmed.    Work-up: none     Therapy/equipment/braces: sent a message to [facility] her PT regarding desensitization options and trial of modalities     Medications: started gabapentin 300mg at bedtime to help with sleep and will adjust the dose at our next visit; discussed other options as well     Interventions: we can consider TPIs but her pain is very diffuse    Referral / follow up with other providers: MTM referral to gradually taper off fluvoxamine and incrementally increase her Cymbalta dose in order to minimize risk of side effects with both on board (sent a message to Lala). Our pain psychologists accept referrals only from the pain providers in the pain clinic; told her that I will ask for some outside options and discuss again at next visit     Follow up: in 3-4 weeks to discuss her response to meds - I will continue to follow until her med regimen is stable then she can continue to follow up with Nidhi       A number of therapists can be found in your local community through [email or web address]. I was able to filter results for chronic pain management and found some therapist in [place] who also provider online  services.         Again, thank you for allowing me to participate in the care of your patient.      Sincerely,    Cornelia Veliz MD

## 2024-05-28 NOTE — NURSING NOTE
Chief Complaint   Patient presents with    New Patient     Here for consult, confirmed with patient     Daisha Orellana

## 2024-05-30 ENCOUNTER — MYC MEDICAL ADVICE (OUTPATIENT)
Dept: PHARMACY | Facility: OTHER | Age: 44
End: 2024-05-30
Payer: COMMERCIAL

## 2024-05-30 ENCOUNTER — TELEPHONE (OUTPATIENT)
Dept: PHYSICAL MEDICINE AND REHAB | Facility: CLINIC | Age: 44
End: 2024-05-30
Payer: COMMERCIAL

## 2024-05-30 NOTE — TELEPHONE ENCOUNTER
MTM referral from: [facility] visit (referral by provider)    MTM referral outreach attempt #2 on May 30, 2024 at 11:10 AM      Outcome: Patient not reachable after several attempts, routed to Pharmacist Team/Provider as an FYI    Use HBC for the carrier/Plan on the flowsheet      eEye Message Sent    PAIGE Antonio

## 2024-06-20 ENCOUNTER — THERAPY VISIT (OUTPATIENT)
Dept: PHYSICAL THERAPY | Facility: CLINIC | Age: 44
End: 2024-06-20
Attending: PHYSICIAN ASSISTANT
Payer: COMMERCIAL

## 2024-06-20 DIAGNOSIS — U09.9 POST-COVID CHRONIC NEUROLOGIC SYMPTOMS: ICD-10-CM

## 2024-06-20 DIAGNOSIS — M79.10 MYALGIA: ICD-10-CM

## 2024-06-20 DIAGNOSIS — R29.90 POST-COVID CHRONIC NEUROLOGIC SYMPTOMS: ICD-10-CM

## 2024-06-20 PROCEDURE — 97161 PT EVAL LOW COMPLEX 20 MIN: CPT | Mod: GP | Performed by: PHYSICAL THERAPIST

## 2024-06-20 PROCEDURE — 97112 NEUROMUSCULAR REEDUCATION: CPT | Mod: GP | Performed by: PHYSICAL THERAPIST

## 2024-06-20 NOTE — PROGRESS NOTES
PHYSICAL THERAPY EVALUATION  Type of Visit: Evaluation    PT Orders:  Myalgia [M79.10]  - Primary  Post-COVID chronic neurologic symptoms [R29.90, U09.9]  04/29/24  Nidhi Herbert PA-C    Chart Review 4/29/24, Nidhi Herbert PA-C:  Patient returns for follow-up.  Briefly was initially seen May 2022.  At that visit she told me she had developed a viral infection September 11, 2020 where she lost her taste and smell and had blurred vision her COVID test was negative at this time a month later she developed her myalgias which have continued since patient did get a COVID-vaccine in May 2021 and this intensified her pain for 6 weeks she got her second shot which did cause pain but not to the same extent as the first.  She most recently saw Dr. Friedman in December of 2022.  At this visit she had seen rheumatology with a negative work-up. She was working with neurology and a biopsy was negative with full work-up. She was reporting waxing and waning symptoms fatigue with stable and concentration with stable.  Today she reports continues muscle pain and sleeps mostly on her stomach. Sleeping on side or back causes pain.  She had a nerve biopsy in September 2022 which was normal.   Patient has constant aching pain.   Patient developed this symptoms after a loss of taste and smell in 2020.      Today ,4/29/24, patient still having lots of pain. Patient cannot tolerate leggings due to aching legs.  She cannot wear heavy material either.  She wears light loose fabric. Patient has poor quality of life due to her pain.  Patient is having worsening paraesthesias.  She is doing some light exercising and this can help a little. Smell and taste is ok and is coming and going.  She is working on decreasing processed sugar.  Sleep is still poor due to pain.     1. Myalgia/Post-COVID chronic neurologic symptoms  Patient with lingering myalgias and paresthesias.  Rheumatology and neurology work-up normal.  Encouraged to keep  appointment with Dr. Veliz. Will prescribe Cymbalta 20mg starting 1x a day for 14 days and then increasing to BID. Due to patient being on Luvox currently discussed symptoms of serotonin syndrome to be aware of and contact either me or PCP if developing symptoms.  Side effects of Cymbalta discussed if she decides to start.  Will also place referral for Pain Psychology as well as PT to help patient maintain mobility. If she does have a form of fibromyalgia this will help with her pain.  Will recheck B6 as well.   - DULoxetine (CYMBALTA) 20 MG capsule; Take 1 capsule (20 mg) by mouth daily for 14 days, THEN 1 capsule (20 mg) 2 times daily for 90 days.  Dispense: 194 capsule; Refill: 0  - Physical Therapy  Referral; Future  - Adult Mental Health  Referral; Future  - Vitamin B6; Future        Subjective       Presenting condition or subjective complaint:  Pain.  Date of onset:    3.5 years following COVID dx  Relevant medical history:     Past Medical History:   Diagnosis Date    Depression 2010    took fluoxetine for 1 year. Stopped due to conception concerns. stable off the      Dates & types of surgery:    Past Surgical History:   Procedure Laterality Date    MOUTH SURGERY      wisdom teeth         Patient report:     Pain assessment:   Duration - 3 1/2 years   Description - 'chaffing feeling', 'tinging'   Denies - numbness, no lightheadeness.  Frequency - painful 'periodically.'  Exacerbated - after a minute of laying buttock aches.' Reports any part of body she puts pressure on hurts   Limitations - sleeping and travel limited d/t pain - reports she needs to bring an air mattress with her if sleeping outside her home because other beds are too hard. Limited tolerance to wearing clothing, especially tight clothing.   Exercise - reports she cannot participate in aerobic exercise or will have insomnia and limited energy the next day. Onset of this limitation in 2014 after a medifast diet. She  continues with 'light exercise' walking aroudn her house x30 min/day and tolerates this well.  Progress - no longer shooting pain.   Regression - clothes more uncomfortable to wear this summer  Description of why she has pain - 'nerve damage'   Mood - reports feeling depressed because when she isn't sleeping well and needs to think so much about what she is going to wear so it doesn't bother her.    Prior PT - none.  Medications - none for BP.    PLOF: prior to COVID in 2020 reports working part time. 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 Now, doesn't think she could go back to work if she wanted to d/t fatigue.    Patient goals for therapy:  travel and sleep on beds provided without needing to bring an air mattress. Wear clothes that are tighter fitting without chaffing feeling.       Objective     30 sec sit to stand: 13 reps, no inc sxs, reports some tingling in LEs. (Age/gender matched norms: 20-60 yo F = 22.6 reps)    6MWT: 420 meters (1379')  (Age/gender matched norms: 40-50 yo F = 664 m) HR: 75 bpm (rest) increased to 79 bpm following, O2sat: 97%, /68 (rest) increased to 130/75. No inc RR. Reports 'light exertion'      Assessment & Plan   CLINICAL IMPRESSIONS  Medical Diagnosis:    Myalgia [M79.10]  - Primary  Post-COVID chronic neurologic symptoms [R29.90, U09.9]  Treatment Diagnosis:   Impaired functional mobility and activity tolerance d/t pain and deconditioning     Clinical Decision Making (Complexity):  Clinical Presentation: Stable/Uncomplicated  Clinical Presentation Rationale: based on medical and personal factors listed in PT evaluation  Clinical Decision Making (Complexity): Low complexity    PLAN OF CARE  Treatment Interventions:  Interventions: Neuromuscular Re-education, Therapeutic Activity, Therapeutic Exercise    Long Term Goals        30 sec sit to stand:   Pt to complete >/= 19 reps of sit <> stands in 30 sec to demonstrate improved flunctional LE strength closer to age/gender  matched norms, withability to move quickly through transitions without exacerbating pain or fatigue.   to maximize safety and independence with performance of ADLs and functional tasks;   at [facility], 6/20/24: 13 reps, no inc sxs, reports some tingling in LEs. (Age/gender matched norms: 20-60 yo F = 22.6 reps)   9/4/2024     6MWT   Pt to complete >/= 490 meters during 6MWT to demonstrate clinical significance in walking endurance and improved tolerance towards aerobic exercise without exacerbation of pain or fatigue.   to maximize safety and independence within the community;   at [facility], 6/20/24: 420 meters (1379') (Age/gender matched norms: 40-48 yo F = 664 m) HR: 75 bpm (rest) increased to 79 bpm following, O2sat: 97%, /68 (rest) increased to 130/75. No inc RR. Reports 'light exertion'   9/4/2024     Sleep   Pt to report she can travel and sleep on beds provided without needing to bring air mattress with her towards improved sleeping tolerance without pain.  to maximize safety and independence with performance of ADLs and functional tasks;    at [facility], 6/20/24: leeping and travel limited d/t pain - reports she needs to bring an air mattress with her if sleeping outside her home because other beds are too hard.   9/4/2024     Hyperesthesia/Clothing   Pt to report she is able to wear tighter fitting clothing without a chaffing feeling   to maximize safety and independence with self cares;   at [facility], 6/20/24: reports limited tolerance to wearing clothing, especially tight clothing d/t a 'chaffing' feeling   9/4/2024     HEP   Pt to report consistant participation in aerobic exercie, healthy habits for pain management, and good understanding of pain neurophysiology for progress towards all goals.   at [facility], 6/20/24: reports she cannot participate in aerobic exercise or will have insomnia and limited energy the next day. However, she will walk around her house for 30 min/day and tolerates this well. Requests better  understanding of how exercise/other healthy habits can decrease pain.   9/4/2024       Frequency of Treatment:  1x/week   Duration of Treatment:  8 weeks    Recommended Referrals to Other Professionals: patient to start therapy with this evaluating therapist, will recommend referral to PT specializing in pain neuroscience prn    Education Assessment: Pt would benefit from dedicated focus on Pain Neuroscience Education and aerobic exercise towards improved understanding of pain management strategies towards recovery and rehabilitation.         Risks and benefits of evaluation/treatment have been explained.   Patient/Family/caregiver agrees with Plan of Care.     Evaluation Time:   37           Signing Clinician: MATT Frost [facility]                                                                                   OUTPATIENT PHYSICAL THERAPY      PLAN OF TREATMENT FOR OUTPATIENT REHABILITATION   Patient's Last Name, First Name, Cherie Estrada YOB: 1980   Provider's Name   [facility]   Medical Record No.  [id number]     Onset Date:   7/10/2021  Start of Care Date:  6/20/2024      Medical Diagnosis:   Myalgia [M79.10]  - Primary  Post-COVID chronic neurologic symptoms [R29.90, U09.9]      PT Treatment Diagnosis:   Impaired functional mobility and activity tolerance d/t pain and deconditioning  Plan of Treatment  Frequency/Duration: 1x/week x8 weeks    Certification date from 6/20/2024 - 9/4/2024        See note for plan of treatment details and functional goals     Sirisha Orourke, MATT                         I CERTIFY THE NEED FOR THESE SERVICES FURNISHED UNDER        THIS PLAN OF TREATMENT AND WHILE UNDER MY CARE     (Physician attestation of this document indicates review and certification of the therapy plan).              Referring Provider:  Nidhi Herbert    Initial Assessment  See Epic Evaluation-

## 2024-06-28 ENCOUNTER — TELEPHONE (OUTPATIENT)
Dept: PHYSICAL MEDICINE AND REHAB | Facility: CLINIC | Age: 44
End: 2024-06-28
Payer: COMMERCIAL

## 2024-06-28 NOTE — TELEPHONE ENCOUNTER
Health Call Center    Phone Message    May a detailed message be left on voicemail: yes     Reason for Call: Other: FYI patient called to reschedule appointment with Dr. Veliz for 10/8/2024 stating she has not had a chance to try new medication yet due to wanting to do Physical Therapy first. Her appointment was originally scheduled as virtual, writer wanting to make sure virtual is okay for this appointment. No need to call back if appointment is scheduled correctly.      Action Taken: Message routed to:  [facility] PM&R    Travel Screening: Not Applicable     Date of Service:

## 2024-07-01 ENCOUNTER — TELEPHONE (OUTPATIENT)
Dept: PHARMACY | Facility: OTHER | Age: 44
End: 2024-07-01

## 2024-07-01 NOTE — TELEPHONE ENCOUNTER
Writer called and left patient message informing her that Dr. Veliz is in agreement with her rescheduling out to October.  Stated that the appointment 10/7 with Dr. Veliz was scheduled as in person, so if she wants it to be virtual she can call to have this changed.      Writer also mentioned that Dr. Veliz recommended that she see a Pharmacist for a medication therapy management visit and they have been unable to reach her to schedule this, so she can call them at [phone] to schedule this appointment.    Ginger Brown RN on 7/1/2024 at 11:45 AM

## 2024-07-01 NOTE — TELEPHONE ENCOUNTER
MTM referral from: [facility] visit (referral by provider)    MTM referral outreach attempt #2 on July 1, 2024 at 2:44 PM      Outcome: Left Message  Patient called and left vm on Friday June 18th, made 2 attempts to try to get a hold of patient. Looks like there is a referral for her to schedule an appt with neuro MTM dept.           Francisco Huber  [facility]

## 2024-07-06 ENCOUNTER — HEALTH MAINTENANCE LETTER (OUTPATIENT)
Age: 44
End: 2024-07-06

## 2024-07-19 ENCOUNTER — THERAPY VISIT (OUTPATIENT)
Dept: PHYSICAL THERAPY | Facility: CLINIC | Age: 44
End: 2024-07-19
Attending: PHYSICIAN ASSISTANT
Payer: COMMERCIAL

## 2024-07-19 DIAGNOSIS — M79.10 MYALGIA: Primary | ICD-10-CM

## 2024-07-19 PROCEDURE — 97112 NEUROMUSCULAR REEDUCATION: CPT | Mod: GP | Performed by: PHYSICAL THERAPIST

## 2025-01-13 ENCOUNTER — VIRTUAL VISIT (OUTPATIENT)
Dept: PHYSICAL MEDICINE AND REHAB | Facility: CLINIC | Age: 45
End: 2025-01-13
Payer: COMMERCIAL

## 2025-01-13 VITALS — BODY MASS INDEX: 24.11 KG/M2 | HEIGHT: 66 IN | WEIGHT: 150 LBS

## 2025-01-13 DIAGNOSIS — M79.10 MYALGIA: ICD-10-CM

## 2025-01-13 DIAGNOSIS — G47.9 SLEEP DIFFICULTIES: ICD-10-CM

## 2025-01-13 DIAGNOSIS — U09.9 LONG COVID: ICD-10-CM

## 2025-01-13 DIAGNOSIS — U09.9 POST-COVID CHRONIC NEUROLOGIC SYMPTOMS: ICD-10-CM

## 2025-01-13 DIAGNOSIS — M79.2 NEUROPATHIC PAIN: Primary | ICD-10-CM

## 2025-01-13 DIAGNOSIS — R29.90 POST-COVID CHRONIC NEUROLOGIC SYMPTOMS: ICD-10-CM

## 2025-01-13 PROCEDURE — 98006 SYNCH AUDIO-VIDEO EST MOD 30: CPT | Performed by: PHYSICIAN ASSISTANT

## 2025-01-13 SDOH — SOCIAL STABILITY: SOCIAL NETWORK

## 2025-01-13 SDOH — SOCIAL STABILITY: SOCIAL NETWORK: I HAVE TROUBLE DOING ALL OF MY USUAL WORK (INCLUDE WORK AT HOME): SOMETIMES

## 2025-01-13 SDOH — SOCIAL STABILITY: SOCIAL NETWORK: I HAVE TROUBLE DOING ALL OF MY REGULAR LEISURE ACTIVITIES WITH OTHERS: USUALLY

## 2025-01-13 SDOH — SOCIAL STABILITY: SOCIAL NETWORK: I HAVE TROUBLE DOING ALL OF THE FAMILY ACTIVITIES THAT I WANT TO DO: SOMETIMES

## 2025-01-13 SDOH — SOCIAL STABILITY: SOCIAL NETWORK: I HAVE TROUBLE DOING ALL OF THE ACTIVITIES WITH FRIENDS THAT I WANT TO DO: USUALLY

## 2025-01-13 SDOH — SOCIAL STABILITY: SOCIAL NETWORK: PROMIS ABILITY TO PARTICIPATE IN SOCIAL ROLES & ACTIVITIES T-SCORE: 42

## 2025-01-13 ASSESSMENT — ANXIETY QUESTIONNAIRES
IF YOU CHECKED OFF ANY PROBLEMS ON THIS QUESTIONNAIRE, HOW DIFFICULT HAVE THESE PROBLEMS MADE IT FOR YOU TO DO YOUR WORK, TAKE CARE OF THINGS AT HOME, OR GET ALONG WITH OTHER PEOPLE: SOMEWHAT DIFFICULT
1. FEELING NERVOUS, ANXIOUS, OR ON EDGE: MORE THAN HALF THE DAYS
GAD7 TOTAL SCORE: 8
7. FEELING AFRAID AS IF SOMETHING AWFUL MIGHT HAPPEN: SEVERAL DAYS
3. WORRYING TOO MUCH ABOUT DIFFERENT THINGS: SEVERAL DAYS
7. FEELING AFRAID AS IF SOMETHING AWFUL MIGHT HAPPEN: SEVERAL DAYS
6. BECOMING EASILY ANNOYED OR IRRITABLE: SEVERAL DAYS
4. TROUBLE RELAXING: SEVERAL DAYS
8. IF YOU CHECKED OFF ANY PROBLEMS, HOW DIFFICULT HAVE THESE MADE IT FOR YOU TO DO YOUR WORK, TAKE CARE OF THINGS AT HOME, OR GET ALONG WITH OTHER PEOPLE?: SOMEWHAT DIFFICULT
5. BEING SO RESTLESS THAT IT IS HARD TO SIT STILL: SEVERAL DAYS
GAD7 TOTAL SCORE: 8
2. NOT BEING ABLE TO STOP OR CONTROL WORRYING: SEVERAL DAYS
GAD7 TOTAL SCORE: 8

## 2025-01-13 ASSESSMENT — PATIENT HEALTH QUESTIONNAIRE - PHQ9
SUM OF ALL RESPONSES TO PHQ QUESTIONS 1-9: 7
SUM OF ALL RESPONSES TO PHQ QUESTIONS 1-9: 7
10. IF YOU CHECKED OFF ANY PROBLEMS, HOW DIFFICULT HAVE THESE PROBLEMS MADE IT FOR YOU TO DO YOUR WORK, TAKE CARE OF THINGS AT HOME, OR GET ALONG WITH OTHER PEOPLE: SOMEWHAT DIFFICULT

## 2025-01-13 ASSESSMENT — PAIN SCALES - GENERAL: PAINLEVEL_OUTOF10: MODERATE PAIN (5)

## 2025-01-13 NOTE — NURSING NOTE
Current patient location: [address] [facility] [address]    Is the patient currently in the state of [place]? YES    Visit mode: VIDEO    If the visit is dropped, the patient can be reconnected by:VIDEO VISIT: Send to e-mail at: [email or web address]    Will anyone else be joining the visit? NO  (If patient encounters technical issues they should call [phone]    Are changes needed to the allergy or medication list? Pt stated no changes to allergies and Pt stated no med changes    Are refills needed on medications prescribed by this physician? NO    Rooming Documentation:  Questionnaire(s) completed    Reason for visit: NICOLE PETERSONF

## 2025-01-13 NOTE — LETTER
1/13/2025       RE: Cherie Emanuel  [address]  [place] [facility] [facility] [address]     Dear Colleague,    Thank you for referring your patient, Cherie Emanuel, to the [facility] PHYSICAL MEDICINE AND REHABILITATION CLINIC [place] [facility]. Please see a copy of my visit note below.    Cherie Emanuel is a 43 year old female who presents to be evaluated for a billable video visit.    Video-Visit Details    Video visit Start time: 12:42 PM    Type of service:  Video Visit    Video End Time: 1:08 PM    Originating Location (pt. Location): Home    Distant Location (provider location):  Off- Site    Platform used for Video Visit: [facility]    Assessment/Impression   1.  Neuropathic pain ( primary) /Myalgia/Post-COVID chronic neurologic symptoms  Patient with lingering myalgias and paresthesias.  Rheumatology and neurology work-up normal.  Encouraged to keep appointment with Dr. Veliz.  Discussed continuing Luvox and will start LDN. Discussed side effects and encouraged patient to start with 1 mg and see patient in a month.  Discussed also referral to pain management for other options.     2. Sleep difficulties  Patient with trouble falling asleep. Encouraged patient to trial OTC medications to start as this is likely due to pain.  Discussed Unisom 12.5mg and to trail for no more then 2 weeks.  Will work on pain control to see if this will help by starting LDN.     3. Long COVID  Discussed COVID and Post COVID with patient.  Educational materials provided and all questions answered.  Discussed booster vaccination and risk for increasing myalgias, as she had these previously with previous vaccines.      Plan:  I reviewed present knowledge on long-Covid.  Education was provided and question were answered.  Orders/Referrals as above  I will advised patient on test results  I will follow up with Cherie Emanuel in 1 months. I will review progress and consider need for any  other therapeutic interventions. If there are any questions and/or concerns she will call the clinic.      On day of encounter time spent in chart review and with patient in consultation, exam, education,coordination of care,  review of outside charts/documentation and documentation:  30 minutes     I have attempted to proof read for major spelling errors and apologize for any minor errors I may have missed.     This note was dictated using voice recognition software. Any grammatical or context distortions are unintentional and inherent to the software.  _________________________________  Nidhi Herbert PA-C  [facility] PHYSICAL MEDICINE AND REHABILITATION CLINIC [facility]   Patient returns for follow-up.  Briefly was initially seen May 2022.  At that visit she told me she had developed a viral infection September 11, 2020 where she lost her taste and smell and had blurred vision her COVID test was negative at this time a month later she developed her myalgias which have continued since patient did get a COVID-vaccine in May 2021 and this intensified her pain for 6 weeks she got her second shot which did cause pain but not to the same extent as the first.  She most recently saw Dr. Friedman in December of 2022.  At this visit she had seen rheumatology with a negative work-up. She was working with neurology and a biopsy was negative with full work-up. She was reporting waxing and waning symptoms fatigue with stable and concentration with stable.  Today she reports continues muscle pain and sleeps mostly on her stomach. Sleeping on side or back causes pain.  She had a nerve biopsy in September 2022 which was normal.   Patient has constant aching pain.   Patient developed this symptoms after a loss of taste and smell in 2020.     Today 4/29/24,   patient still having lots of pain. Patient cannot tolerate leggings due to aching legs.  She cannot wear heavy material either.  She wears  light loose fabric. Patient has poor quality of life due to her pain.  Patient is having worsening paraesthesias.  She is doing some light exercising and this can help a little. Smell and taste is ok and is coming and going.  She is working on decreasing processed sugar.  Sleep is still poor due to pain.     Today  Patient has seen Dr. Veliz in evaluation and was started on Gabapentin and recommended to start PT as well as see medication management team.   For PT she saw Sirisha Pope and was referred to another physical therapist. She never trailed Gabapentin or Cymbalta.  She feels her pain is still having issues with clothing sensitivity.  She is interested in trailing other medications and working with pain management. She may need to return to work soon and is concerned about her functionality.      Current concerns: Health Concerns        1/13/2025    11:31 AM   PHQ Assesment Total Score(s)   PHQ-9 Score 7        Patient-reported           1/13/2025    11:32 AM   SANDRA-7 Results   SANDRA 7 TOTAL SCORE 8 (mild anxiety)   SANDRA-7 Total Score 8        Patient-reported         1/13/2025    11:32 AM   PTSD Screen Score   Have you ever experienced this kind of event? No         1/13/2025    11:45 AM   PROMIS-29   PROMIS Physical Function T-Score 37 (moderate dysfunction)   PROMIS Anxiety T-Score 67 (moderate)   PROMIS Depression T-Score 59 (mild)   PROMIS Fatigue T-Score 76 (severe)   PROMIS Sleep Disturbance T-Score 64 (moderate)   PROMIS Ability to Participate in Social Roles & Activities T-Score 42 (mild dysfunction)   PROMIS Pain Interference T-Score 67 (moderate)   PROMIS Pain Intensity 6         Past Medical History:   Diagnosis Date     Depression 2010    took fluoxetine for 1 year. Stopped due to conception concerns. stable off the        Past Surgical History:   Procedure Laterality Date     MOUTH SURGERY      wisdom teeth       Family History   Problem Relation Age of Onset     Diabetes Maternal Grandfather          overweight     Heart Failure Maternal Grandfather      Cancer Paternal Grandmother         ? breast cancer     C.A.D. Paternal Grandfather          of MI in 70's       Social History     Tobacco Use     Smoking status: Never     Smokeless tobacco: Never   Vaping Use     Vaping status: Never Used   Substance Use Topics     Alcohol use: Yes     Comment: occ, maybe glass of wine per week     Drug use: Never         Current Outpatient Medications:      DULoxetine (CYMBALTA) 20 MG capsule, Take 1 capsule (20 mg) by mouth daily for 14 days, THEN 1 capsule (20 mg) 2 times daily for 90 days., Disp: 194 capsule, Rfl: 0     fluvoxaMINE (LUVOX) 50 MG tablet, Take 50 mg by mouth, Disp: , Rfl:      gabapentin (NEURONTIN) 300 MG capsule, Take 1 capsule (300 mg) by mouth at bedtime for 360 days, Disp: 90 capsule, Rfl: 3     magnesium carbonate (MAGONATE) 200 mg/ml liquid, , Disp: , Rfl:      NO ACTIVE MEDICATIONS, , Disp: , Rfl:     Review of Systems   Constitutional, HEENT, cardiovascular, pulmonary, GI, , musculoskeletal, neuro, skin, endocrine and psych systems are negative, except as otherwise noted.      Objective         Vitals:  No vitals were obtained today due to virtual visit.    Physical Exam   EYES: Eyes grossly normal to inspection.  No discharge or erythema, or obvious scleral/conjunctival abnormalities.  SKIN: Visible skin clear. No significant rash, abnormal pigmentation or lesions.  NEURO: Cranial nerves grossly intact.  Mentation and speech appropriate for age.  GENERAL: Healthy, alert and no distress  RESP: No audible wheeze, cough, or visible cyanosis.  No visible retractions or increased work of breathing.    PSYCH: Mentation appears normal, affect normal/bright, judgement and insight intact, normal speech and appearance well-groomed.    Labs:  No recent labs     Imaging:    I personally reviewed the following imaging results today and those on care everywhere, if indicated     Nothing new to  review    Reviewed imaging from [facility] sites     Medical Records Reviewed:    Reviewed consults/documents from [facility] including  Family Medicine, PMR and PT        Again, thank you for allowing me to participate in the care of your patient.      Sincerely,    Nidhi Herbert PA-C

## 2025-01-13 NOTE — PROGRESS NOTES
Cherie Emanuel is a 43 year old female who presents to be evaluated for a billable video visit.    Video-Visit Details    Video visit Start time: 12:42 PM    Type of service:  Video Visit    Video End Time: 1:08 PM    Originating Location (pt. Location): Home    Distant Location (provider location):  Off- Site    Platform used for Video Visit: [facility]    Assessment/Impression   1.  Neuropathic pain ( primary) /Myalgia/Post-COVID chronic neurologic symptoms  Patient with lingering myalgias and paresthesias.  Rheumatology and neurology work-up normal.  Encouraged to keep appointment with Dr. Veliz.  Discussed continuing Luvox and will start LDN. Discussed side effects and encouraged patient to start with 1 mg and see patient in a month.  Discussed also referral to pain management for other options.     2. Sleep difficulties  Patient with trouble falling asleep. Encouraged patient to trial OTC medications to start as this is likely due to pain.  Discussed Unisom 12.5mg and to trail for no more then 2 weeks.  Will work on pain control to see if this will help by starting LDN.     3. Long COVID  Discussed COVID and Post COVID with patient.  Educational materials provided and all questions answered.  Discussed booster vaccination and risk for increasing myalgias, as she had these previously with previous vaccines.      Plan:  I reviewed present knowledge on long-Covid.  Education was provided and question were answered.  Orders/Referrals as above  I will advised patient on test results  I will follow up with Cherie Emanuel in 1 months. I will review progress and consider need for any other therapeutic interventions. If there are any questions and/or concerns she will call the clinic.      On day of encounter time spent in chart review and with patient in consultation, exam, education,coordination of care,  review of outside charts/documentation and documentation:  30 minutes     I have attempted to proof read for major  spelling errors and apologize for any minor errors I may have missed.     This note was dictated using voice recognition software. Any grammatical or context distortions are unintentional and inherent to the software.  _________________________________  LAURA [facility] PHYSICAL MEDICINE AND REHABILITATION CLINIC [facility]   Patient returns for follow-up.  Briefly was initially seen May 2022.  At that visit she told me she had developed a viral infection September 11, 2020 where she lost her taste and smell and had blurred vision her COVID test was negative at this time a month later she developed her myalgias which have continued since patient did get a COVID-vaccine in May 2021 and this intensified her pain for 6 weeks she got her second shot which did cause pain but not to the same extent as the first.  She most recently saw Dr. Friedman in December of 2022.  At this visit she had seen rheumatology with a negative work-up. She was working with neurology and a biopsy was negative with full work-up. She was reporting waxing and waning symptoms fatigue with stable and concentration with stable.  Today she reports continues muscle pain and sleeps mostly on her stomach. Sleeping on side or back causes pain.  She had a nerve biopsy in September 2022 which was normal.   Patient has constant aching pain.   Patient developed this symptoms after a loss of taste and smell in 2020.     Today 4/29/24,   patient still having lots of pain. Patient cannot tolerate leggings due to aching legs.  She cannot wear heavy material either.  She wears light loose fabric. Patient has poor quality of life due to her pain.  Patient is having worsening paraesthesias.  She is doing some light exercising and this can help a little. Smell and taste is ok and is coming and going.  She is working on decreasing processed sugar.  Sleep is still poor due to pain.     Today  Patient has seen   Jose Alfredo in evaluation and was started on Gabapentin and recommended to start PT as well as see medication management team.   For PT she saw Sirisha Pope and was referred to another physical therapist. She never trailed Gabapentin or Cymbalta.  She feels her pain is still having issues with clothing sensitivity.  She is interested in trailing other medications and working with pain management. She may need to return to work soon and is concerned about her functionality.      Current concerns: Health Concerns        2025    11:31 AM   PHQ Assesment Total Score(s)   PHQ-9 Score 7        Patient-reported           2025    11:32 AM   SANDRA-7 Results   SANDRA 7 TOTAL SCORE 8 (mild anxiety)   SANDRA-7 Total Score 8        Patient-reported         2025    11:32 AM   PTSD Screen Score   Have you ever experienced this kind of event? No         2025    11:45 AM   PROMIS-29   PROMIS Physical Function T-Score 37 (moderate dysfunction)   PROMIS Anxiety T-Score 67 (moderate)   PROMIS Depression T-Score 59 (mild)   PROMIS Fatigue T-Score 76 (severe)   PROMIS Sleep Disturbance T-Score 64 (moderate)   PROMIS Ability to Participate in Social Roles & Activities T-Score 42 (mild dysfunction)   PROMIS Pain Interference T-Score 67 (moderate)   PROMIS Pain Intensity 6         Past Medical History:   Diagnosis Date    Depression     took fluoxetine for 1 year. Stopped due to conception concerns. stable off the        Past Surgical History:   Procedure Laterality Date    MOUTH SURGERY      wisdom teeth       Family History   Problem Relation Age of Onset    Diabetes Maternal Grandfather         overweight    Heart Failure Maternal Grandfather     Cancer Paternal Grandmother         ? breast cancer    C.A.D. Paternal Grandfather          of MI in 70's       Social History     Tobacco Use    Smoking status: Never    Smokeless tobacco: Never   Vaping Use    Vaping status: Never Used   Substance Use Topics    Alcohol use: Yes      Comment: occ, maybe glass of wine per week    Drug use: Never         Current Outpatient Medications:     DULoxetine (CYMBALTA) 20 MG capsule, Take 1 capsule (20 mg) by mouth daily for 14 days, THEN 1 capsule (20 mg) 2 times daily for 90 days., Disp: 194 capsule, Rfl: 0    fluvoxaMINE (LUVOX) 50 MG tablet, Take 50 mg by mouth, Disp: , Rfl:     gabapentin (NEURONTIN) 300 MG capsule, Take 1 capsule (300 mg) by mouth at bedtime for 360 days, Disp: 90 capsule, Rfl: 3    magnesium carbonate (MAGONATE) 200 mg/ml liquid, , Disp: , Rfl:     NO ACTIVE MEDICATIONS, , Disp: , Rfl:     Review of Systems   Constitutional, HEENT, cardiovascular, pulmonary, GI, , musculoskeletal, neuro, skin, endocrine and psych systems are negative, except as otherwise noted.      Objective         Vitals:  No vitals were obtained today due to virtual visit.    Physical Exam   EYES: Eyes grossly normal to inspection.  No discharge or erythema, or obvious scleral/conjunctival abnormalities.  SKIN: Visible skin clear. No significant rash, abnormal pigmentation or lesions.  NEURO: Cranial nerves grossly intact.  Mentation and speech appropriate for age.  GENERAL: Healthy, alert and no distress  RESP: No audible wheeze, cough, or visible cyanosis.  No visible retractions or increased work of breathing.    PSYCH: Mentation appears normal, affect normal/bright, judgement and insight intact, normal speech and appearance well-groomed.    Labs:  No recent labs     Imaging:    I personally reviewed the following imaging results today and those on care everywhere, if indicated     Nothing new to review    Reviewed imaging from [facility] sites     Medical Records Reviewed:    Reviewed consults/documents from [facility] including  Family Medicine, PMR and PT

## 2025-01-20 ENCOUNTER — TELEPHONE (OUTPATIENT)
Dept: PHYSICAL MEDICINE AND REHAB | Facility: CLINIC | Age: 45
End: 2025-01-20
Payer: COMMERCIAL

## 2025-01-20 NOTE — TELEPHONE ENCOUNTER
Left Voicemail (1st Attempt) and Sent Mychart (1st Attempt) for the patient to call back and schedule the following:    Appointment type: Post Covid return video  Provider: Nidhi Herbert  Return date: around 2/10/2025   Specialty phone number: [phone]  Additional appointment(s) needed: NA  Additonal Notes: 1 month follow up    Xochilt Troncoso on 1/20/2025 at 10:30 AM

## 2025-01-22 ENCOUNTER — TELEPHONE (OUTPATIENT)
Dept: PHYSICAL MEDICINE AND REHAB | Facility: CLINIC | Age: 45
End: 2025-01-22
Payer: COMMERCIAL

## 2025-01-22 NOTE — TELEPHONE ENCOUNTER
Patient confirmed scheduled appointment:  Date: 3/13/2025  Time: 2:15 pm  Visit type: Post covid return video  Provider: Nidhi Herbert  Location: virtual  Testing/imaging: NA  Additional notes: Follow up    Xochilt Troncoso on 1/22/2025 at 1:51 PM

## 2025-02-21 NOTE — PROGRESS NOTES
"                      [facility] Consultation    Date of visit: 2/25/2025    Reason for consultation:    Cherie Emanuel is a 44 year old female who is seen in consultation today at the request of her primary care physician, Nidhi Herbert PA-C.     Consultation and Evaluation for:       Review of [place] Prescription Monitoring Program (): Today I have also reviewed the patient's history of controlled substance use, as provided by [place] licensed pharmacies and prescriber dispensers.       Review of Pain Questionnaire: Please see the [facility] health questionnaire, which the patient completed and reviewed with me in detail.    Review of Electronic Chart: Today I have also reviewed available medical information in the patient's medical record at [place] ([facility] including relevant provider notes, laboratory work, and imaging.       Chief Complaint:    Chief Complaint   Patient presents with    Pain       Pain history:  Cherie Emanuel is a 44 year old female who presents for initial evaluation of the following chronic pain complaints.     - Her pain began about about 4 years ago.  She was lying in bed and abruptly woke up from severe pain in her back and buttock.  She describes it as sleeping on cement versus a mattress.    - She thinks she may have had undiagnosed covid 4 weeks prior to this.   - Her nerves feel \"sensitive\" and it has now spread to everywhere in her body.   -The pain is causing a lot of sleeping disturbance.    - She is sensitive to clothing touching her body.   - She has seen a neurologist at the [facility] 2 years ago and had a biopsy (leg) that was negative for small fiber neuropathy.   - She takes a magnesium supplement before bed.  She does not take other supplements.  - Seeing Dr. Veliz in PM&R and long covid clinic  - Recently started on LDN and she tried it for 2 days and it gave her flu like symptoms.   - Has not tried Gabapentin, " lyrica or cymbalta.   - She has done minimal PT. she does walk daily and is pretty active.  - Full Rheumatology workup and labs were normal.   - She is  with kids 7,9,11.  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    Red Flags: The patient denies bowel or bladder incontinence, parasthesias, weakness, saddle anesthesia, unintentional weight loss, or fever/chills/sweats.       PAIN QUESTIONAIRE:   Patient reported symptoms:  Patient Supplied Answers To the  Pain Questionnaire      2/25/2025    12:46 PM   [facility] -  Patient Entered Questionnaire/Answers   What number best describes your pain right now:  0 = No pain  to  10 = Worst pain imaginable 6   How would you describe the pain dull, aching    pressure    other   Which of the following worsen your pain lying down    standing    sitting   Which of the following improve or reduce your pain relaxation    nothing relieves the pain   What number best describes your average pain for the past week:  0 = No pain  to  10 = Worst pain imaginable 7   What number best describes your LOWEST pain in past 24 hours:  0 = No pain  to  10 = Worst pain imaginable 4   What number best describes your WORST pain in past 24 hours:  0 = No pain  to  10 = Worst pain imaginable 8   When is your pain worst Night   What non-medicine treatments have you already had for your pain none            MEDICATIONS FOR PAIN:   NONE    Previous medication trials:   Cymbalta  Gabapentin  LD naltrexone       Past pain treatments:   Pain Clinic:  No    PT:  Yes   Psychologist:  No  Self-care:  Yes       INJECTIONS:   NONE    SURGICAL HISTORY:   NONE    IMAGING:  MRI cervical spine done at [facility] on 9/9/2021:   FINDINGS:    Cervical vertebral heights are preserved. No significant spondylolisthesis. Intervertebral disc heights appear largely preserved. Normal spinal cord signal and caliber.     C2-3: Unremarkable.     C3-4:  Unremarkable.     C4-5: Unremarkable.      C5-6: Unremarkable.     C6-7: Broad-based disc bulge eccentric to the left, with a left subarticular component. Mild canal stenosis. Mild/moderate left foraminal narrowing.     C7-T1: No significant canal stenosis. Minimal left foraminal narrowing.     IMPRESSION:    1. No acute fracture or spondylolisthesis.   2. Previously noted disc bulge at C6-7 is similar in appearance to the prior study; there is persistent mild spinal canal stenosis and mild/moderate left foraminal narrowing.     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 Stopped due to conception concerns. stable off the        Past Surgical History:  Past Surgical History:   Procedure Laterality Date    MOUTH SURGERY      wisdom teeth       Medications:  Current Outpatient Medications   Medication Sig Dispense Refill    COMPOUNDED NON-CONTROLLED SUBSTANCE (CMPD RX) - PHARMACY TO MIX COMPOUNDED MEDICATION Take 1 mg tablet  of Low dose naltrexone daily. 30 tablet 1    DULoxetine (CYMBALTA) 20 MG capsule Take 1 capsule (20 mg) by mouth daily for 14 days, THEN 1 capsule (20 mg) 2 times daily for 90 days. 194 capsule 0    fluvoxaMINE (LUVOX) 50 MG tablet Take 50 mg by mouth      gabapentin (NEURONTIN) 300 MG capsule Take 1 capsule (300 mg) by mouth at bedtime for 360 days 90 capsule 3    magnesium carbonate (MAGONATE) 200 mg/ml liquid       NO ACTIVE MEDICATIONS          Allergies:   No Known Allergies    Family history:  Family History   Problem Relation Age of Onset    Diabetes Maternal Grandfather         overweight    Heart Failure Maternal Grandfather      Cancer Paternal Grandmother         ? breast cancer    C.A.D. Paternal Grandfather          of MI in 70's       Physical Exam:  Vitals:    25 1332   BP: 121/82   Pulse: 106   SpO2: 96%       GENERAL: alert and no distress  EYES: Eyes grossly normal to inspection.  No discharge or erythema, or obvious scleral/conjunctival abnormalities.  RESP: No audible wheeze, cough, or visible cyanosis.    SKIN: Visible skin clear. No significant rash, abnormal pigmentation or lesions.  NEURO: Cranial nerves grossly intact.  Mentation and speech appropriate for age.  PSYCH: Appropriate affect, tone, and pace of words      ASSESSMENT/PLAN:  The following recommendations were given to the patient. Diagnosis, treatment options, risks, benefits, and alternatives were discussed, and all questions were answered. The patient expressed understanding of the plan for management.     Cherie Emanuel is a 44 year old female with a past medical history of depression who is being seen at the pain clinic for the following chronic pain conditions.      1. Neuropathic pain (Primary)    The patient has history of a quite abrupt start of neuropathic pain and extreme skin sensitivity about 4 years ago.  She believes she may have had undiagnosed COVID about a month prior to this.  As I explained to the patient I am unsure as to the etiology of her pain.  It does not sound like fibromyalgia.  She does not have any muscle aches and her pain is more superficial neuropathic sensitivity.    She is currently a patient at the physical medicine and rehab long COVID clinic.  Trials of medications including gabapentin and low-dose naltrexone have not been helpful and have been discontinued.    She previously saw a neurologist many years ago at the [facility] and a biopsy on her leg was done to rule out small fiber neuropathy and it was negative.  She is interested in getting a second opinion on this and an external referral was placed for  her to go to [facility]    Recommend that she discontinue her current magnesium supplement and switch to magnesium glycinate 500 to 1000 mg nightly.    Vitamin D lab was low and I recommend she buy a supplement.  When she has a follow-up with her primary care provider I would recommend she have this level rechecked.    I am recommending a retrial of gabapentin.  Plan for her to start at 300 mg at night for a couple weeks increase to 600 mg at night for a couple weeks and then increase to 900 mg at night if tolerated.  We could consider adding this during daytime hours if helpful.  Could also consider trial of Lyrica in the future.    Could also consider trial of Cymbalta in the future.    - Pain Management  Referral  - Adult Neurology  Referral; Future    2. Post-COVID chronic neurologic symptoms    - Pain Management  Referral        MEDICATIONS:     No orders of the defined types were placed in this encounter.        - Continue other medications without change           FOLLOW UP: April 22, 2025 @1:45 PM      BILLING TIME DOCUMENTATION:   The total TIME spent on this patient on the date of the encounter/appointment was 48 minutes.      TOTAL TIME includes:   Time spent preparing to see the patient (reviewing records and tests) - 3 min  Time spent face to face (or over the phone) with the patient - 37 min  Time spent ordering tests, medications, procedures and referrals - 2 min  Time spent Referring and communicating with other healthcare professionals - 0 min  Time spent documenting clinical information in Epic - 6 min       MARSHA MONTALVO MD   Pain Management

## 2025-02-25 ENCOUNTER — OFFICE VISIT (OUTPATIENT)
Dept: PALLIATIVE MEDICINE | Facility: CLINIC | Age: 45
End: 2025-02-25
Attending: PHYSICIAN ASSISTANT
Payer: COMMERCIAL

## 2025-02-25 VITALS — DIASTOLIC BLOOD PRESSURE: 82 MMHG | SYSTOLIC BLOOD PRESSURE: 121 MMHG | HEART RATE: 106 BPM | OXYGEN SATURATION: 96 %

## 2025-02-25 DIAGNOSIS — M79.2 NEUROPATHIC PAIN: Primary | ICD-10-CM

## 2025-02-25 DIAGNOSIS — U09.9 POST-COVID CHRONIC NEUROLOGIC SYMPTOMS: ICD-10-CM

## 2025-02-25 DIAGNOSIS — R29.90 POST-COVID CHRONIC NEUROLOGIC SYMPTOMS: ICD-10-CM

## 2025-02-25 PROCEDURE — 99204 OFFICE O/P NEW MOD 45 MIN: CPT | Performed by: ANESTHESIOLOGY

## 2025-02-25 ASSESSMENT — ANXIETY QUESTIONNAIRES
6. BECOMING EASILY ANNOYED OR IRRITABLE: SEVERAL DAYS
GAD7 TOTAL SCORE: 5
3. WORRYING TOO MUCH ABOUT DIFFERENT THINGS: SEVERAL DAYS
GAD7 TOTAL SCORE: 5
8. IF YOU CHECKED OFF ANY PROBLEMS, HOW DIFFICULT HAVE THESE MADE IT FOR YOU TO DO YOUR WORK, TAKE CARE OF THINGS AT HOME, OR GET ALONG WITH OTHER PEOPLE?: SOMEWHAT DIFFICULT
5. BEING SO RESTLESS THAT IT IS HARD TO SIT STILL: NOT AT ALL
GAD7 TOTAL SCORE: 5
7. FEELING AFRAID AS IF SOMETHING AWFUL MIGHT HAPPEN: SEVERAL DAYS
2. NOT BEING ABLE TO STOP OR CONTROL WORRYING: SEVERAL DAYS
7. FEELING AFRAID AS IF SOMETHING AWFUL MIGHT HAPPEN: SEVERAL DAYS
IF YOU CHECKED OFF ANY PROBLEMS ON THIS QUESTIONNAIRE, HOW DIFFICULT HAVE THESE PROBLEMS MADE IT FOR YOU TO DO YOUR WORK, TAKE CARE OF THINGS AT HOME, OR GET ALONG WITH OTHER PEOPLE: SOMEWHAT DIFFICULT
1. FEELING NERVOUS, ANXIOUS, OR ON EDGE: SEVERAL DAYS
4. TROUBLE RELAXING: NOT AT ALL

## 2025-02-25 ASSESSMENT — PAIN SCALES - PAIN ENJOYMENT GENERAL ACTIVITY SCALE (PEG)
INTERFERED_GENERAL_ACTIVITY: 7
AVG_PAIN_PASTWEEK: 7
PEG_TOTALSCORE: 7.33
AVG_PAIN_PASTWEEK: 7
INTERFERED_ENJOYMENT_LIFE: 8
PEG_TOTALSCORE: 7.33
INTERFERED_GENERAL_ACTIVITY: 7
INTERFERED_ENJOYMENT_LIFE: 8

## 2025-02-25 ASSESSMENT — PAIN SCALES - GENERAL: PAINLEVEL_OUTOF10: MODERATE PAIN (6)

## 2025-02-25 NOTE — PATIENT INSTRUCTIONS
Switch to Magnesium glycinate a normal dose at bedtime is 500-1000mg   Try a Vit D supplement 500IU-5000IU daily.  Recommend you ask your primary care provider to repeat a vitamin D lab at your next follow-up.  Gabapentin 300mg at night for a couple weeks - increase as tolerated to as much as 900mg at night.  We can add this during the day if really helpful.   I placed an external referral and faxed it to the [facility], [facility].  You should just be able to call and schedule an appointment.  A follow-up was scheduled for April 22 at 1:45 PM.      ----------------------------------------------------------------  Clinic Number:  [phone]   Call with any questions about your care and for scheduling assistance.   Calls are returned Monday through Friday between 8 AM and 4:30 PM. We usually get back to you within 2 business days depending on the issue/request.    If we are prescribing your medications:  For opioid medication refills, call the clinic or send a [employer] message 7 days in advance.  Please include:  Name of requested medication  Name of the pharmacy.  For non-opioid medications, call your pharmacy directly to request a refill. Please allow 3-4 days to be processed.   Per [place] State Law:  All controlled substance prescriptions must be filled within 30 days of being written.    For those controlled substances allowing refills, pickup must occur within 30 days of last fill.      We believe regular attendance is key to your success in our program!    Any time you are unable to keep your appointment we ask that you call us at least 24 hours in advance to cancel.This will allow us to offer the appointment time to another patient.   Multiple missed appointments may lead to dismissal from the clinic.

## 2025-03-17 ENCOUNTER — OFFICE VISIT (OUTPATIENT)
Dept: PHYSICAL MEDICINE AND REHAB | Facility: CLINIC | Age: 45
End: 2025-03-17
Payer: COMMERCIAL

## 2025-03-17 VITALS — OXYGEN SATURATION: 97 % | HEART RATE: 101 BPM | SYSTOLIC BLOOD PRESSURE: 106 MMHG | DIASTOLIC BLOOD PRESSURE: 71 MMHG

## 2025-03-17 DIAGNOSIS — R29.90 POST-COVID CHRONIC NEUROLOGIC SYMPTOMS: ICD-10-CM

## 2025-03-17 DIAGNOSIS — M79.2 NEUROPATHIC PAIN: Primary | ICD-10-CM

## 2025-03-17 DIAGNOSIS — U09.9 POST-COVID CHRONIC NEUROLOGIC SYMPTOMS: ICD-10-CM

## 2025-03-17 PROBLEM — N81.0 URETHROCELE: Status: ACTIVE | Noted: 2025-03-13

## 2025-03-17 PROBLEM — G63 NEUROPATHY DUE TO MEDICAL CONDITION: Status: ACTIVE | Noted: 2020-11-15

## 2025-03-17 PROBLEM — N39.42 URINARY INCONTINENCE WITHOUT SENSORY AWARENESS: Status: ACTIVE | Noted: 2025-03-17

## 2025-03-17 PROBLEM — R32 URINARY INCONTINENCE, UNSPECIFIED TYPE: Status: ACTIVE | Noted: 2025-03-17

## 2025-03-17 PROBLEM — R06.83 SNORING: Status: ACTIVE | Noted: 2025-03-17

## 2025-03-17 PROBLEM — G25.81 RESTLESS LEGS: Status: ACTIVE | Noted: 2025-03-17

## 2025-03-17 PROBLEM — N81.10 POP-Q STAGE 2 CYSTOCELE: Status: ACTIVE | Noted: 2025-03-17

## 2025-03-17 PROBLEM — N94.3 PREMENSTRUAL TENSION SYNDROME: Status: ACTIVE | Noted: 2025-03-17

## 2025-03-17 PROBLEM — G47.00 INSOMNIA: Status: ACTIVE | Noted: 2025-03-17

## 2025-03-17 PROBLEM — N39.41 URGE URINARY INCONTINENCE: Status: ACTIVE | Noted: 2025-03-17

## 2025-03-17 PROCEDURE — 99214 OFFICE O/P EST MOD 30 MIN: CPT | Performed by: PHYSICAL MEDICINE & REHABILITATION

## 2025-03-17 NOTE — PROGRESS NOTES
PM&R Clinic Note     Patient Name: Cherie Emanuel : 1980 Medical Record: [id number]            History of Present Illness:     Cherie Emanuel is a 44 year old female who presented to clinic today for follow up regarding her diffuse neuropathic pain and myalgia.     Background from the consult note 2024; referral from Nidhi Herbert PA-C  She has history of myalgia and post-COVID chronic neurological symptoms and is closely followed by Nidhi Herbert PA-C in our post-COVID clinic    Per chart review, her pain started in Sep 2020. Prior to that she had 2-3 days with loss of sense of taste and some blurry vision. Several weeks later it was uncomfortable to lay down and she could not maintain a supine position or sitting position for prolonged periods of time.  Every surface felt hard and uncomfortable.  This interfered with her sleep.  Her feet would feel heavy and would feel uncomfortable on the tile floor.  Pain sensation has been generally stable but with intermittent exacerbations associated with COVID vaccination or infection.  Pain temporarily worsened after she had an acute COVID infection in 2022.  A couple of times she has lost her sense of taste accompanied by pain worsening but without any other overt respiratory symptoms.       She has tingling of hands and feet that has improved since time of onset.  Tingling started spring 2021 with simultaneous onset at hands and feet (up to calf level on both legs, mostly at hand level but occasional extension to forearm level).  Tingling was consistently present for many months.  Tingling sensation started improving January or 2022.  Tingling is almost resolved currently, she occasionally feels tingling in the bottoms of her feet.     Several months ago she developed sharp shooting pain in her hands and feet.  She would have sudden onset sharp pain in her hands and feet that would last less than a second.  These sensations would occur  20-30 times daily.  After starting fluvoxamine June 2022, the painful sensations sensations completely resolved.     She had numbness in left greater than right lateral thigh when she was taking a B vitamin complex for a couple of weeks.  Numbness resolved when she stopped taking the B vitamin.  She denies current numbness.     She has had constipation her whole life.  She experienced intermittent heat and cold insensitivity starting fall 2020 manifested as discomfort in hot tub, intolerance of sitting on the beach, intolerance of going out in the cold last winter.  She denies orthostatic dizziness or recent syncopal episodes.     She denies frequent cramps, fasciculations, or difficulty with muscle stiffness and muscle relaxation. Speech and swallowing are normal. Appetite is good and weight is relatively stable. She denies breathing difficulties or shortness of breath while lying flat. Mood and affect are appropriate. She is independent, has no assistive devices, able to ambulate independently, and is not falling. She denies sleep complaints or restless leg symptoms.      She was seen and evaluated by rheumatology team in 7/2022 and work-up was negative for any inflammatory causes.     She was also seen and evaluated in the neuromuscular clinic 8/2022 and again full work-up including skin biopsy 9/2022 was unremarkable. Skin biopsy showed normal calf IENFD. Finding argues against a SFN and is prognostically favorable.     Neurosurgery and orthopedic teams were also involved in her care.      Symptoms,  Today, she noted that she feels like she has more neve pain than muscle pain. She still gets tingling sensation with specific cloths and this interrupts her sleep very much. Even taking shower is a challenge and triggers the tingling sensation. She never feels fully rested.     Nerve pain in her face is more intermittent. Always hurts to sleep on her stomach so she is constantly sleeping on her back. Her nipples are  very sensitive at all times and tingling sensation in that area never goes away. Sleeping on her back is also difficult because she feels like sleeping on cement. She has done a lot of padding for her bed and basically has to take them anywhere that they go when travel. She has to use a cushion for any chair that she uses.     She is overall coping but definitely feels trapper by these altered and uncomfortable sensations.   She has episodes of stress incontinence but overall has full control on her bowel and bladder function.   No falls or LOB.   She has slight and intermittent blurry vision.   Her sense of tasted has not been back since 2020.      Meds/injections,   Cymbalta - has not started yet   Tried gabapentin but short time and low dose   Takes tylenol and ibuprofen with no benefits   No injections in the past       Therapies/HEP/equipments,  Will start PT in June.      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   Right hand-dominant   Drives       Interval history   She has been medically stable since our last visit about a year ago.     Has been followed by Nidhi in our post-COVID clinic; last seen 1/13/25 - Virtual Visit with Nidhi Herbert PA-C (01/13/2025)     Was seen and evaluated by pain team 2/25/25 - Office Visit with Michelle Castrejon MD (02/25/2025)     She was doing reasonably well today and reported no significant changes in her symptoms since our last visit (as detailed above). She did switch her magnesium supplement per Dr. Castrejon s recommendation but has not started gabapentin, noting that she did not receive a new prescription. A neurology referral to [facility] is scheduled for next week for a second  opinion.    She also mentioned that, following our visit last year and communication with the PT team, she was referred to a new physical therapist but never initiated therapy. She is unsure whether she received a follow-up call or notification regarding this.             Past Medical and Surgical History:     Past Medical History:   Diagnosis Date    Depression     took fluoxetine for 1 year. Stopped due to conception concerns. stable off the      Past Surgical History:   Procedure Laterality Date    MOUTH SURGERY      wisdom teeth            Social History:     Social History     Tobacco Use    Smoking status: Never    Smokeless tobacco: Never   Substance Use Topics    Alcohol use: Yes     Comment: occ, maybe glass of wine per week              Family History:     Family History   Problem Relation Age of Onset    Diabetes Maternal Grandfather         overweight    Heart Failure Maternal Grandfather     Cancer Paternal Grandmother         ? breast cancer    C.A.D. Paternal Grandfather          of MI in 70's            Medications:     Current Outpatient Medications   Medication Sig Dispense Refill    NO ACTIVE MEDICATIONS       COMPOUNDED NON-CONTROLLED SUBSTANCE (CMPD RX) - PHARMACY TO MIX COMPOUNDED MEDICATION Take 1 mg tablet  of Low dose naltrexone daily. (Patient not taking: Reported on 3/17/2025) 30 tablet 1    DULoxetine (CYMBALTA) 20 MG capsule Take 1 capsule (20 mg) by mouth daily for 14 days, THEN 1 capsule (20 mg) 2 times daily for 90 days. 194 capsule 0    fluvoxaMINE (LUVOX) 50 MG tablet Take 50 mg by mouth      gabapentin (NEURONTIN) 300 MG capsule Take 1 capsule (300 mg) by mouth at bedtime for 360 days (Patient not taking: Reported on 3/17/2025) 90 capsule 3    magnesium carbonate (MAGONATE) 200 mg/ml liquid  (Patient not taking: Reported on 3/17/2025)              Allergies:     No Known Allergies           ROS:     A focused ROS is negative other than the symptoms noted above in the  "HPI.             Physical Examiniation:     VITAL SIGNS: /71   Pulse 101   SpO2 97%   BMI: Estimated body mass index is 24.58 kg/m  as calculated from the following:    Height as of 1/13/25: 1.664 m (5' 5.5\").    Weight as of 1/13/25: 68 kg (150 lb).    Gen: NAD, pleasant and cooperative   Cardio: regular pulse  Pulm: non-labored breathing in room air  Abd: benign  Ext: WWP, no edema in BLE, no tenderness in calves  Neuro/MSK: - full exam was deferred for conversation today   Strength was 5/5 at bilateral upper and lower extremities    Sensation was intact to LT and she was able to localize touch with closed eyes- has dysesthesia in feet and hands  Reflexes were 1-2+ and symmetric at bilateral upper extremities    No clonus at ankles; negative Pascal b/l            Laboratory/Imaging:     Prior pertinent laboratory work-up:  2/18/2021: normal vitamin B6, vitamin B12, vitamin E, copper, A1C, TSH, SPEP, ESR, CRP, KETTY, COLBY panel  11/9/2021: low vitamin D (26.8), normal ESR, CRP, rheumatoid factor, ANCA, folic acid, thiamine, LFTs, Lyme, myasthenia antibody panel, myomarker 3 antibody panel (anti-Charley-1, anti-PL-7, anti-PL-12, anti-EJ, anti-OJ, anti-SRP, anti-MI-2, anti-TIF-1gamma, anti-MDA-5, anti-NXP-2, anti-SAE1, anti-PM/SCL-100, anti-Ku, anti-SSA, anti-U1RNP, anti-U2RNP, anti-U3RNP)  4/26/2022: normal KETTY, rheumatoid factor, CCP, CRP, ESR, tryptase, histamine, aldolase, hepatitis C, CK  5/23/2022: normal vitamin B6, MMA, B12     Prior pertinent radiology work-up:  3/21/2021: MRI brain wo/w contrast was normal (no raw images available)  5/3/2021: MRI cervical and thoracic spine showed T11-12 mild-moderate central stenosis secondary to an extruded central disc herniation abutting and flattening the ventral cord.  There was a C6-7 extruded left disc herniation moderately impingint hte left C7 nerve root, causing mild to moderate asymmetric left canal stenosis with left ventral cord abutment/flattening.  (no raw " images available)  9/9/2021: MRI cervical spine without contrast showed unchanged C6-7 disc bulge and mild spinal canal stenosis and mild/moderate left foraminal narrowing (no raw images available)     Prior electrophysiologic work-up:  6/29/2021: Nerve conduction studies/EMG performed by Dr. Torres of the left upper and lower extremities were normal           Assessment/Plan:     Initial presentation with possible COVID symptoms 9/2020 - negative test  First  vaccine 5/2021  First formal diagnosis of COVID infection 2/2022  Diffuse neuropathic symptoms and pain      I fully understand her frustration with the absence of a clear diagnosis for her diffuse neuropathic symptoms. I explained that such scenarios are not uncommon in the medical field, particularly in the context of post-COVID conditions. It appears that further diagnostic work is not necessary at this stage, and our focus should be on management.    Her treatment options have been limited, primarily due to her plans to get pregnant. However, her symptoms are significantly affecting her quality of life, and I believe it is important to have a relatively more aggressive approach to managing her symptoms for about 6-9 months pending her course. Discussed long term complications of untreated chronic pain and central sensitization. She has already been dealing with these symptoms for 3+ years.     Today, we thoroughly reviewed her management plan, as outlined below. She doesn't seem interested in medication options at this time, which is completely OK and understandable. She asked about medical cannabis, which appears to be a reasonable option to explore, and I encouraged her to discuss this further with Dr. Castrejon. She also raised questions about physical therapy and psychological support, both of which we reviewed in detail.    --Patient Education:  We had an in-depth discussion about her assessment and reviewed the rationale behind each of the following  recommendations. The patient demonstrated readiness to engage, asked relevant questions, and confirmed understanding of the information provided.    --Work-up:  No additional diagnostic work-up recommended at this time.    --Therapy/Equipment/Braces:  I sent a message to Sirisha, her physical therapist, regarding the plan moving forward.    --Medications:  I recommended trialing gabapentin, as previously suggested by Dr. Castrejon. She may still have the prescription I provided last year. I advised her to contact Dr. Castrejon if she needs a new prescription.    --Interventions:  Trigger point injections were considered but deferred again today due to the diffuse nature of her pain.    --Referrals/Coordination of Care:  We previously discussed a referral to MTM to gradually taper fluvoxamine and incrementally increase Cymbalta to minimize potential side effects from both medications. Although she has not yet followed up on this, she remains interested in consulting with the MTM team. I sent a message to Dr. Castrejon for her input on this plan, including whether MTM through the pain clinic or the neuroscience clinic would be more appropriate. Based on my understanding, pain psychology services require a referral from a pain clinic provider; I encouraged her to discuss this with Dr. Castrejon as well. Additionally, I provided her with the [email or web address] resource to help locate a psychologist if desired.    --Follow-up:  I am available to see her as needed, but at this point, it appears most appropriate for her to continue follow-up with Dr. Castrejon as scheduled.      Cornelia Veliz MD  Physical Medicine & Rehabilitation    **  03/18/25   Wayne back from Sirisha, see confidential note for details. Placed a new PT referral for Cherie.     Should still call Cherie with this update and also with the info that I am waiting to hear back from Dr. Castrejon regarding MTM.     03/19/25   Wayne back from Dr. Castrejon. Appreciate her input.  Will defer MTM consult, pain psychology referral and medical cannabis discussion to Cherie's follow up visit with Dr. Castrejon in April.     I called and left a message for Cherie with updates.

## 2025-03-17 NOTE — PATIENT INSTRUCTIONS
A number of therapists can be found in your local community through [email or web address]. I was able to filter results for chronic pain management and found some therapist in [place] who also provider online services.

## 2025-03-17 NOTE — LETTER
3/17/2025       RE: Cherie Emanuel  [address] [facility] [id number]     Dear Colleague,    Thank you for referring your patient, Cherie Emanuel, to the [facility] [place] [facility]. Please see a copy of my visit note below.           PM&R Clinic Note     Patient Name: Cherie Emanuel : 1980 Medical Record: [id number]            History of Present Illness:     Cherie Emanuel is a 44 year old female who presented to clinic today for follow up regarding her diffuse neuropathic pain and myalgia.     Background from the consult note 2024; referral from Nidhi Herbert PA-C  She has history of myalgia and post-COVID chronic neurological symptoms and is closely followed by Nidhi Herbert PA-C in our post-COVID clinic    Per chart review, her pain started in Sep 2020. Prior to that she had 2-3 days with loss of sense of taste and some blurry vision. Several weeks later it was uncomfortable to lay down and she could not maintain a supine position or sitting position for prolonged periods of time.  Every surface felt hard and uncomfortable.  This interfered with her sleep.  Her feet would feel heavy and would feel uncomfortable on the tile floor.  Pain sensation has been generally stable but with intermittent exacerbations associated with COVID vaccination or infection.  Pain temporarily worsened after she had an acute COVID infection in 2022.  A couple of times she has lost her sense of taste accompanied by pain worsening but without any other overt respiratory symptoms.       She has tingling of hands and feet that has improved since time of onset.  Tingling started spring 2021 with simultaneous onset at hands and feet (up to calf level on both legs, mostly at hand level but occasional extension to forearm level).  Tingling was consistently present for many months.  Tingling sensation started improving January or 2022.   Tingling is almost resolved currently, she occasionally feels tingling in the bottoms of her feet.     Several months ago she developed sharp shooting pain in her hands and feet.  She would have sudden onset sharp pain in her hands and feet that would last less than a second.  These sensations would occur 20-30 times daily.  After starting fluvoxamine June 2022, the painful sensations sensations completely resolved.     She had numbness in left greater than right lateral thigh when she was taking a B vitamin complex for a couple of weeks.  Numbness resolved when she stopped taking the B vitamin.  She denies current numbness.     She has had constipation her whole life.  She experienced intermittent heat and cold insensitivity starting fall 2020 manifested as discomfort in hot tub, intolerance of sitting on the beach, intolerance of going out in the cold last winter.  She denies orthostatic dizziness or recent syncopal episodes.     She denies frequent cramps, fasciculations, or difficulty with muscle stiffness and muscle relaxation. Speech and swallowing are normal. Appetite is good and weight is relatively stable. She denies breathing difficulties or shortness of breath while lying flat. Mood and affect are appropriate. She is independent, has no assistive devices, able to ambulate independently, and is not falling. She denies sleep complaints or restless leg symptoms.      She was seen and evaluated by rheumatology team in 7/2022 and work-up was negative for any inflammatory causes.     She was also seen and evaluated in the neuromuscular clinic 8/2022 and again full work-up including skin biopsy 9/2022 was unremarkable. Skin biopsy showed normal calf IENFD. Finding argues against a SFN and is prognostically favorable.     Neurosurgery and orthopedic teams were also involved in her care.      Symptoms,  Today, she noted that she feels like she has more neve pain than muscle pain. She still gets tingling sensation  with specific cloths and this interrupts her sleep very much. Even taking shower is a challenge and triggers the tingling sensation. She never feels fully rested.     Nerve pain in her face is more intermittent. Always hurts to sleep on her stomach so she is constantly sleeping on her back. Her nipples are very sensitive at all times and tingling sensation in that area never goes away. Sleeping on her back is also difficult because she feels like sleeping on cement. She has done a lot of padding for her bed and basically has to take them anywhere that they go when travel. She has to use a cushion for any chair that she uses.     She is overall coping but definitely feels trapper by these altered and uncomfortable sensations.   She has episodes of stress incontinence but overall has full control on her bowel and bladder function.   No falls or LOB.   She has slight and intermittent blurry vision.   Her sense of tasted has not been back since 2020.      Meds/injections,   Cymbalta - has not started yet   Tried gabapentin but short time and low dose   Takes tylenol and ibuprofen with no benefits   No injections in the past       Therapies/HEP/equipments,  Will start PT in June.      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   Right hand-dominant   Drives       Interval history   She has been medically stable since our last visit about a year ago.     Has been followed by Nidhi in our post-COVID clinic; last seen 1/13/25 - Virtual Visit with Nidhi Herbert PA-C (01/13/2025)     Was seen and evaluated by pain team 2/25/25 - Office Visit with Michelle Castrejon MD (02/25/2025)     She was doing reasonably well today and reported no  significant changes in her symptoms since our last visit (as detailed above). She did switch her magnesium supplement per Dr. Castrejon s recommendation but has not started gabapentin, noting that she did not receive a new prescription. A neurology referral to [facility] is scheduled for next week for a second opinion.    She also mentioned that, following our visit last year and communication with the PT team, she was referred to a new physical therapist but never initiated therapy. She is unsure whether she received a follow-up call or notification regarding this.             Past Medical and Surgical History:     Past Medical History:   Diagnosis Date     Depression     took fluoxetine for 1 year. Stopped due to conception concerns. stable off the      Past Surgical History:   Procedure Laterality Date     MOUTH SURGERY      wisdom teeth            Social History:     Social History     Tobacco Use     Smoking status: Never     Smokeless tobacco: Never   Substance Use Topics     Alcohol use: Yes     Comment: occ, maybe glass of wine per week              Family History:     Family History   Problem Relation Age of Onset     Diabetes Maternal Grandfather         overweight     Heart Failure Maternal Grandfather      Cancer Paternal Grandmother         ? breast cancer     C.A.D. Paternal Grandfather          of MI in 70's            Medications:     Current Outpatient Medications   Medication Sig Dispense Refill     NO ACTIVE MEDICATIONS        COMPOUNDED NON-CONTROLLED SUBSTANCE (CMPD RX) - PHARMACY TO MIX COMPOUNDED MEDICATION Take 1 mg tablet  of Low dose naltrexone daily. (Patient not taking: Reported on 3/17/2025) 30 tablet 1     DULoxetine (CYMBALTA) 20 MG capsule Take 1 capsule (20 mg) by mouth daily for 14 days, THEN 1 capsule (20 mg) 2 times daily for 90 days. 194 capsule 0     fluvoxaMINE (LUVOX) 50 MG tablet Take 50 mg by mouth       gabapentin (NEURONTIN) 300 MG capsule Take 1 capsule (300 mg) by mouth at  "bedtime for 360 days (Patient not taking: Reported on 3/17/2025) 90 capsule 3     magnesium carbonate (MAGONATE) 200 mg/ml liquid  (Patient not taking: Reported on 3/17/2025)              Allergies:     No Known Allergies           ROS:     A focused ROS is negative other than the symptoms noted above in the HPI.             Physical Examiniation:     VITAL SIGNS: /71   Pulse 101   SpO2 97%   BMI: Estimated body mass index is 24.58 kg/m  as calculated from the following:    Height as of 1/13/25: 1.664 m (5' 5.5\").    Weight as of 1/13/25: 68 kg (150 lb).    Gen: NAD, pleasant and cooperative   Cardio: regular pulse  Pulm: non-labored breathing in room air  Abd: benign  Ext: WWP, no edema in BLE, no tenderness in calves  Neuro/MSK: - full exam was deferred for conversation today   Strength was 5/5 at bilateral upper and lower extremities    Sensation was intact to LT and she was able to localize touch with closed eyes- has dysesthesia in feet and hands  Reflexes were 1-2+ and symmetric at bilateral upper extremities    No clonus at ankles; negative Pascal b/l            Laboratory/Imaging:     Prior pertinent laboratory work-up:  2/18/2021: normal vitamin B6, vitamin B12, vitamin E, copper, A1C, TSH, SPEP, ESR, CRP, KETTY, COLBY panel  11/9/2021: low vitamin D (26.8), normal ESR, CRP, rheumatoid factor, ANCA, folic acid, thiamine, LFTs, Lyme, myasthenia antibody panel, myomarker 3 antibody panel (anti-Charley-1, anti-PL-7, anti-PL-12, anti-EJ, anti-OJ, anti-SRP, anti-MI-2, anti-TIF-1gamma, anti-MDA-5, anti-NXP-2, anti-SAE1, anti-PM/SCL-100, anti-Ku, anti-SSA, anti-U1RNP, anti-U2RNP, anti-U3RNP)  4/26/2022: normal KETTY, rheumatoid factor, CCP, CRP, ESR, tryptase, histamine, aldolase, hepatitis C, CK  5/23/2022: normal vitamin B6, MMA, B12     Prior pertinent radiology work-up:  3/21/2021: MRI brain wo/w contrast was normal (no raw images available)  5/3/2021: MRI cervical and thoracic spine showed T11-12 mild-moderate " central stenosis secondary to an extruded central disc herniation abutting and flattening the ventral cord.  There was a C6-7 extruded left disc herniation moderately impingint hte left C7 nerve root, causing mild to moderate asymmetric left canal stenosis with left ventral cord abutment/flattening.  (no raw images available)  9/9/2021: MRI cervical spine without contrast showed unchanged C6-7 disc bulge and mild spinal canal stenosis and mild/moderate left foraminal narrowing (no raw images available)     Prior electrophysiologic work-up:  6/29/2021: Nerve conduction studies/EMG performed by Dr. Torres of the left upper and lower extremities were normal           Assessment/Plan:     Initial presentation with possible COVID symptoms 9/2020 - negative test  First  vaccine 5/2021  First formal diagnosis of COVID infection 2/2022  Diffuse neuropathic symptoms and pain      I fully understand her frustration with the absence of a clear diagnosis for her diffuse neuropathic symptoms. I explained that such scenarios are not uncommon in the medical field, particularly in the context of post-COVID conditions. It appears that further diagnostic work is not necessary at this stage, and our focus should be on management.    Her treatment options have been limited, primarily due to her plans to get pregnant. However, her symptoms are significantly affecting her quality of life, and I believe it is important to have a relatively more aggressive approach to managing her symptoms for about 6-9 months pending her course. Discussed long term complications of untreated chronic pain and central sensitization. She has already been dealing with these symptoms for 3+ years.     Today, we thoroughly reviewed her management plan, as outlined below. She doesn't seem interested in medication options at this time, which is completely OK and understandable. She asked about medical cannabis, which appears to be a reasonable option to  explore, and I encouraged her to discuss this further with Dr. Castrejon. She also raised questions about physical therapy and psychological support, both of which we reviewed in detail.    --Patient Education:  We had an in-depth discussion about her assessment and reviewed the rationale behind each of the following recommendations. The patient demonstrated readiness to engage, asked relevant questions, and confirmed understanding of the information provided.    --Work-up:  No additional diagnostic work-up recommended at this time.    --Therapy/Equipment/Braces:  I sent a message to Sirisha, her physical therapist, regarding the plan moving forward.    --Medications:  I recommended trialing gabapentin, as previously suggested by Dr. Castrejon. She may still have the prescription I provided last year. I advised her to contact Dr. Castrejon if she needs a new prescription.    --Interventions:  Trigger point injections were considered but deferred again today due to the diffuse nature of her pain.    --Referrals/Coordination of Care:  We previously discussed a referral to MTM to gradually taper fluvoxamine and incrementally increase Cymbalta to minimize potential side effects from both medications. Although she has not yet followed up on this, she remains interested in consulting with the MTM team. I sent a message to Dr. Castrejon for her input on this plan, including whether MTM through the pain clinic or the neuroscience clinic would be more appropriate. Based on my understanding, pain psychology services require a referral from a pain clinic provider; I encouraged her to discuss this with Dr. Castrejon as well. Additionally, I provided her with the [email or web address] resource to help locate a psychologist if desired.    --Follow-up:  I am available to see her as needed, but at this point, it appears most appropriate for her to continue follow-up with Dr. Castrejon as scheduled.      Cornelia Veliz MD  Physical Medicine &  Rehabilitation    **  03/18/25   [place] back from Sirisha, see confidential note for details. Placed a new PT referral for Cherie.     Should still call Cherie with this update and also with the info that I am waiting to hear back from Dr. Castrejon regarding MTM.     03/19/25   [place] back from Dr. Castrejon. Appreciate her input. Will defer MTM consult, pain psychology referral and medical cannabis discussion to Cherie's follow up visit with Dr. Castrejon in April.     I called and left a message for Cherie with updates.       Again, thank you for allowing me to participate in the care of your patient.      Sincerely,    Cornelia Veliz MD

## 2025-03-18 NOTE — CONFIDENTIAL NOTE
03/18/25     Hi Cherie Pendleton came to me over the summer dx with chronic myalgia. I am not a Pain PT, but I saw her for a few visits applying some Pain Neuroscience techniques that are better applied by our Pain Specialist PTs. I discussed this with Cherie who was agreeable to a transfer of care, and I reached out to our Pain Specialist, Physical Therapist: Renay Cary, PT at [facility] for the handoff. I also communicated with the scheduling staff at [facility] for Tabatha to schedule apts - it sounds like she didn't pursue this.     I would recommend she receive new orders for eval with our Pain PT, Tabatha Cary, as it was planned 8 months ago. Through my conversation with Tabatha, she works closely with our Pain Medicine providers in [place]: Amena Sterling NP and Michelle Castrejon MD for additional recommendations prn.     Thanks for reaching out. Let me know if you have any questions or feedback about this recommendation.     Alber,     Sirisha     --------------    03/19/25       Yeah, I am not sure what to do to help with patient.  I have no clear etiology for her pain.      I can definitely refer her to our pain psychologist for central sensitization education and talk about medical cannabis.  I can also have her see a MTM pharmacist, however, they are more often used to talk about medications that patients are already on.  I fear this meeting would make it less likely for the patient to try gabapentin as they will outline all the possible side effects.  We can talk about it and I can place a referral if she would like to do this.     Michelle

## 2025-04-04 ENCOUNTER — LAB (OUTPATIENT)
Dept: LAB | Facility: CLINIC | Age: 45
End: 2025-04-04
Payer: COMMERCIAL

## 2025-04-04 DIAGNOSIS — E55.9 AVITAMINOSIS D: Primary | ICD-10-CM

## 2025-04-04 PROCEDURE — 36415 COLL VENOUS BLD VENIPUNCTURE: CPT

## 2025-04-04 PROCEDURE — 82306 VITAMIN D 25 HYDROXY: CPT

## 2025-04-05 LAB — VIT D+METAB SERPL-MCNC: 24 NG/ML (ref 20–50)

## 2025-07-13 ENCOUNTER — HEALTH MAINTENANCE LETTER (OUTPATIENT)
Age: 45
End: 2025-07-13

## 2025-07-23 NOTE — PROGRESS NOTES
"Cherie is a 44 year old who is being evaluated via a billable video visit.    How would you like to obtain your AVS? "[employer]hart  If the video visit is dropped, the invitation should be resent by: Other e-mail: "[employer]hart  Will anyone else be joining your video visit? No  Is Pt currently in [place]? Yes    NOTE:  If Pt is not in [place], Appointment needs to be canceled and rescheduled.        Video-Visit Details  Type of service:  Video Visit - converted to tele visit secondary to video issues.   Video Start Time: 1006  Video End Time:1036  Originating Location (pt. Location): Home    Distant Location (provider location):  On-site  Platform used for Video Visit: [facility]      Date of visit: 7/24/2025    Chief complaint:   Chief Complaint   Patient presents with    Pain       Interval history:  Cherie Emanuel is a 44 year old female last seen by me for INITIAL CONSULT on 2/25/2025 and for FOLLOW UP on TODAY is the first follow up.       Since her last visit, Cherie Emanuel reports:    - She has internal muscular pain and all the nerves on the surface of her skin that hurt.  It is worse at night because he is lying on her skin that hurts. This all began post covid diagnosis 4-5 years ago. She had no pain prior to this. Her nerves feel \"sensitive\" and it has now spread to everywhere in her body.  She is sensitive to clothing touching her body.  - She is able to work part time.  16 hours a week  - Went to her visit with \[facility]\"" clinic of neurology 4/4/2025.  They did not have a good diagnosis for her pain. They recommended follow up with the pain clinic and ordered a repeat vit D which was normal.  She is taking Vit D supplimentation now.   - She has had labs drawn for Vit B, Thyroid, lyme and inflammatory labs which were all normal.   - She tried Gabapentin 300mg at night after her neurology visit and could not tolerate it. She reports  \"insomnia\". She tried this " for about a week but quit because she could not sleep on it.  She wonders if there is a lower dose or a different medication in the same class she could try.   - She never started taking Magnesium glycinate because she did not know how to get it.    - She wonders about other medications for sleep. We talked about Tizandidne and trazodone which causes weight gain.     - She did see a neurologist at the [facility] 2 years ago and had a biopsy (leg) that was negative for small fiber neuropathy.   - Seeing Dr. Veliz in PM&R and long [facility] clinic and her last visit was the final visit.   - Recently started on LDN and she tried it for 2 days and it gave her flu like symptoms.   - Has not tried lyrica or cymbalta.   - She has done minimal PT. she does walk daily and is pretty active.  - Full Rheumatology workup and labs were normal.   - She is  with kids 7,9,11.      Patient reported symptoms:  Patient Supplied Answers To the  Pain Questionnaire      2/25/2025    12:46 PM   [facility] -  Patient Entered Questionnaire/Answers   What number best describes your pain right now:  0 = No pain  to  10 = Worst pain imaginable 6   How would you describe the pain dull, aching    pressure    other   Which of the following worsen your pain lying down    standing    sitting   Which of the following improve or reduce your pain relaxation    nothing relieves the pain   What number best describes your average pain for the past week:  0 = No pain  to  10 = Worst pain imaginable 7   What number best describes your LOWEST pain in past 24 hours:  0 = No pain  to  10 = Worst pain imaginable 4   What number best describes your WORST pain in past 24 hours:  0 = No pain  to  10 = Worst pain imaginable 8   When is your pain worst Night   What non-medicine treatments have you already had for your pain none           [place]  REVIEWED TODAY:         MEDICATIONS FOR PAIN:   NONE     Previous medication trials:   Cymbalta  Gabapentin  LD naltrexone      INJECTIONS/SURGERY:  NONE    IMAGING:   MRI cervical spine done at [facility] on 9/9/2021:   FINDINGS:    Cervical vertebral heights are preserved. No significant spondylolisthesis. Intervertebral disc heights appear largely preserved. Normal spinal cord signal and caliber.     C2-3: Unremarkable.     C3-4: Unremarkable.     C4-5: Unremarkable.      C5-6: Unremarkable.     C6-7: Broad-based disc bulge eccentric to the left, with a left subarticular component. Mild canal stenosis. Mild/moderate left foraminal narrowing.     C7-T1: No significant canal stenosis. Minimal left foraminal narrowing.     IMPRESSION:    1. No acute fracture or spondylolisthesis.   2. Previously noted disc bulge at C6-7 is similar in appearance to the prior study; there is persistent mild spinal canal stenosis and mild/moderate left foraminal narrowing.      LABS:   Thyroid normal  Inflammatory labs normal  Vitamin B levels normal  Lyme screen negative  VIT D - LOW       Medications:  Current Outpatient Medications   Medication Sig Dispense Refill    COMPOUNDED NON-CONTROLLED SUBSTANCE (CMPD RX) - PHARMACY TO MIX COMPOUNDED MEDICATION Take 1 mg tablet  of Low dose naltrexone daily. (Patient not taking: Reported on 3/17/2025) 30 tablet 1    DULoxetine (CYMBALTA) 20 MG capsule Take 1 capsule (20 mg) by mouth daily for 14 days, THEN 1 capsule (20 mg) 2 times daily for 90 days. 194 capsule 0    fluvoxaMINE (LUVOX) 50 MG tablet Take 50 mg by mouth      gabapentin (NEURONTIN) 300 MG capsule Take 1 capsule (300 mg) by mouth at bedtime for 360 days (Patient not taking: Reported on 3/17/2025) 90 capsule 3    magnesium carbonate (MAGONATE) 200 mg/ml liquid  (Patient not taking: Reported on 3/17/2025)      NO ACTIVE MEDICATIONS            Physical Exam:  not currently breastfeeding.      ASSESSMENT/PLAN:   Cherie Emanuel is a 44 year old female with a past medical history of depression who is being seen at the pain clinic for the following  chronic pain conditions.       1. Neuropathic pain (Primary)     The patient has history of a quite abrupt start of neuropathic pain and extreme skin sensitivity about 4-5 years ago.  She believes she may have had undiagnosed COVID about a month prior to this.  As I explained to the patient I am unsure as to the etiology of her pain.  It does not sound like fibromyalgia.  She does not have any muscle aches and her pain is more superficial neuropathic sensitivity.     She is currently a patient at the physical medicine and rehab [facility].  Trials of medications including gabapentin and low-dose naltrexone have not been helpful and have been discontinued.     She previously saw a neurologist many years ago at the [facility] and a biopsy on her leg was done to rule out small fiber neuropathy and it was negative.  She got a second opionion at the [facility] which was not helpful.  They recommended she try neurontin which caused insomnia and she discontinued it after a week.      Recommend that she try magnesium glycinate 500 to 1000 mg nightly.     I am recommending a trial of LYRICA.  Plan for her to start at 25 mg BID.  .     Could also consider trial of Cymbalta in the future.    Could consider a TENS unit.  If helpful she may be a candidate for a SCS trial.     Could consider Tizanidine if she has insomnia from the Lyrica.        MEDICATIONS:   Orders Placed This Encounter   Medications    pregabalin (LYRICA) 25 MG capsule     Sig: Take 1 capsule (25 mg) by mouth 2 times daily.     Dispense:  60 capsule     Refill:  2       FOLLOW UP:  EVERY 3 MONTHS - 11/4/2025 @ 2:45PM VIRTUAL VISIT       BILLING TIME DOCUMENTATION:   The total TIME spent on this patient on the date of the encounter/appointment was 40 minutes.      TOTAL TIME includes:   Time spent preparing to see the patient (reviewing records and tests) - 4 min  Time spent face to face (or over the phone) with the  patient - 30 min  Time spent ordering tests, medications, procedures and referrals - 2 min  Time spent Referring and communicating with other healthcare professionals - 0 min  Time spent documenting clinical information in Epic - 4 min      MARSHA MONTALVO MD   Pain Management

## 2025-07-24 ENCOUNTER — VIRTUAL VISIT (OUTPATIENT)
Dept: PALLIATIVE MEDICINE | Facility: CLINIC | Age: 45
End: 2025-07-24
Payer: COMMERCIAL

## 2025-07-24 DIAGNOSIS — M79.2 NEUROPATHIC PAIN: Primary | ICD-10-CM

## 2025-07-24 RX ORDER — PREGABALIN 25 MG/1
25 CAPSULE ORAL 2 TIMES DAILY
Qty: 60 CAPSULE | Refills: 2 | Status: SHIPPED | OUTPATIENT
Start: 2025-07-24

## 2025-07-24 ASSESSMENT — PAIN SCALES - PAIN ENJOYMENT GENERAL ACTIVITY SCALE (PEG)
INTERFERED_ENJOYMENT_LIFE: 9
INTERFERED_GENERAL_ACTIVITY: 7
PEG_TOTALSCORE: 7.67
AVG_PAIN_PASTWEEK: 7

## 2025-07-24 NOTE — PATIENT INSTRUCTIONS
Trial of Lyrica 25mg twice a day.  Recommend you take this in the am and at dinner.      Magnesium glycinate suppliment should help with sleep.  Take 500-1000mg nightly.     Could consider tizanidine which is a muscle relaxant to help with sleep in the future. Could also consider cymbalta trial in the future.     Follow up video visit on 11/4/2025 @ 2:45pm     Michelle Castrejon MD

## (undated) RX ORDER — LIDOCAINE HYDROCHLORIDE 10 MG/ML
INJECTION, SOLUTION EPIDURAL; INFILTRATION; INTRACAUDAL; PERINEURAL
Status: DISPENSED
Start: 2022-09-14